# Patient Record
Sex: FEMALE | Race: WHITE
[De-identification: names, ages, dates, MRNs, and addresses within clinical notes are randomized per-mention and may not be internally consistent; named-entity substitution may affect disease eponyms.]

---

## 2018-09-21 ENCOUNTER — HOSPITAL ENCOUNTER (EMERGENCY)
Age: 23
Discharge: HOME | End: 2018-09-21
Payer: COMMERCIAL

## 2018-09-21 VITALS
HEART RATE: 93 BPM | DIASTOLIC BLOOD PRESSURE: 65 MMHG | OXYGEN SATURATION: 96 % | SYSTOLIC BLOOD PRESSURE: 134 MMHG | RESPIRATION RATE: 18 BRPM

## 2018-09-21 VITALS
SYSTOLIC BLOOD PRESSURE: 131 MMHG | HEART RATE: 84 BPM | OXYGEN SATURATION: 99 % | DIASTOLIC BLOOD PRESSURE: 63 MMHG | RESPIRATION RATE: 18 BRPM

## 2018-09-21 VITALS
RESPIRATION RATE: 16 BRPM | DIASTOLIC BLOOD PRESSURE: 63 MMHG | HEART RATE: 82 BPM | OXYGEN SATURATION: 99 % | SYSTOLIC BLOOD PRESSURE: 125 MMHG | BODY MASS INDEX: 32.5 KG/M2 | TEMPERATURE: 98.42 F

## 2018-09-21 DIAGNOSIS — R10.2: ICD-10-CM

## 2018-09-21 DIAGNOSIS — N83.201: Primary | ICD-10-CM

## 2018-09-21 LAB
ALANINE AMINOTRANSFER ALT/SGPT: 28 U/L (ref 13–56)
ALBUMIN SERPL-MCNC: 3.8 G/DL (ref 3.2–5)
ALKALINE PHOSPHATASE: 57 U/L (ref 45–117)
ANION GAP: 7 (ref 5–15)
AST(SGOT): 28 U/L (ref 15–37)
B-HCG SERPL QL: NEGATIVE NEGATIVE
BILIRUB DIRECT SERPL-MCNC: 0.08 MG/DL (ref 0–0.3)
BUN SERPL-MCNC: 10 MG/DL (ref 7–18)
BUN/CREAT RATIO: 11 RATIO (ref 10–20)
CALCIUM SERPL-MCNC: 8.5 MG/DL (ref 8.5–10.1)
CARBON DIOXIDE: 24 MMOL/L (ref 21–32)
CHLORIDE: 107 MMOL/L (ref 98–107)
DEPRECATED RDW RBC: 39.7 FL (ref 35.1–43.9)
DIFFERENTIAL INDICATED: (no result)
ERYTHROCYTE [DISTWIDTH] IN BLOOD: 12.8 % (ref 11.6–14.6)
EST GLOM FILT RATE - AFR AMER: 98 ML/MIN (ref 60–?)
ESTIMATED CREATININE CLEARANCE: 89.24 ML/MIN
GLOBULIN: 3.9 G/DL (ref 2.2–4.2)
GLUCOSE: 113 MG/DL (ref 74–106)
HCG SERPL QL: NEGATIVE NEGATIVE
HCT VFR BLD AUTO: 35.3 % (ref 37–47)
HCT VFR BLD AUTO: 37.2 % (ref 37–47)
HEMOGLOBIN: 11.7 G/DL (ref 12–15)
HEMOGLOBIN: 12.3 G/DL (ref 12–15)
HGB BLD-MCNC: 11.7 G/DL (ref 12–15)
HGB BLD-MCNC: 12.3 G/DL (ref 12–15)
IMMATURE GRANULOCYTES COUNT: 0.02 X10^3/UL (ref 0–0)
KETONE-DIPSTICK: 5 MG/DL
LEUKOCYTE ESTERASE UR QL STRIP: 25 /UL
LIPASE: 82 U/L (ref 73–393)
MCV RBC: 85.1 FL (ref 81–99)
MEAN CORP HGB CONC: 33.1 G/GL (ref 32–36)
MEAN PLATELET VOL.: 10.3 FL (ref 6.2–12)
MUCOUS THREADS URNS QL MICRO: (no result) /HPF
PLATELET # BLD: 268 K/MM3 (ref 150–450)
PLATELET COUNT: 268 K/MM3 (ref 150–450)
POSITIVE COUNT: NO
POSITIVE DIFFERENTIAL: NO
POSITIVE MORPHOLOGY: NO
POTASSIUM: 4.9 MMOL/L (ref 3.5–5.1)
PROT UR QL STRIP.AUTO: 30 MG/DL
RBC # BLD AUTO: 4.37 M/MM3 (ref 4.2–5.4)
RBC DISTRIBUTION WIDTH CV: 12.8 % (ref 11.6–14.6)
RBC DISTRIBUTION WIDTH SD: 39.7 FL (ref 35.1–43.9)
RBC UR QL: (no result) /HPF (ref 0–5)
SP GR UR: 1.01 (ref 1–1.03)
SQUAMOUS URNS QL MICRO: (no result) /HPF (ref 5–10)
URINE PRESERVATIVE: (no result)
WBC # BLD AUTO: 10.3 K/MM3 (ref 4.4–11)
WHITE BLOOD COUNT: 10.3 K/MM3 (ref 4.4–11)

## 2018-09-21 PROCEDURE — 87110 CHLAMYDIA CULTURE: CPT

## 2018-09-21 PROCEDURE — 80076 HEPATIC FUNCTION PANEL: CPT

## 2018-09-21 PROCEDURE — 83690 ASSAY OF LIPASE: CPT

## 2018-09-21 PROCEDURE — 99285 EMERGENCY DEPT VISIT HI MDM: CPT

## 2018-09-21 PROCEDURE — 74176 CT ABD & PELVIS W/O CONTRAST: CPT

## 2018-09-21 PROCEDURE — 76830 TRANSVAGINAL US NON-OB: CPT

## 2018-09-21 PROCEDURE — 85014 HEMATOCRIT: CPT

## 2018-09-21 PROCEDURE — 85025 COMPLETE CBC W/AUTO DIFF WBC: CPT

## 2018-09-21 PROCEDURE — 87140 CULTURE TYPE IMMUNOFLUORESC: CPT

## 2018-09-21 PROCEDURE — 87210 SMEAR WET MOUNT SALINE/INK: CPT

## 2018-09-21 PROCEDURE — 80048 BASIC METABOLIC PNL TOTAL CA: CPT

## 2018-09-21 PROCEDURE — 87081 CULTURE SCREEN ONLY: CPT

## 2018-09-21 PROCEDURE — 81001 URINALYSIS AUTO W/SCOPE: CPT

## 2018-09-21 PROCEDURE — 84703 CHORIONIC GONADOTROPIN ASSAY: CPT

## 2018-09-21 PROCEDURE — A4216 STERILE WATER/SALINE, 10 ML: HCPCS

## 2018-09-21 PROCEDURE — 85018 HEMOGLOBIN: CPT

## 2018-09-24 ENCOUNTER — HOSPITAL ENCOUNTER (OUTPATIENT)
Age: 23
End: 2018-09-24
Payer: COMMERCIAL

## 2018-09-24 DIAGNOSIS — N83.201: ICD-10-CM

## 2018-09-24 DIAGNOSIS — N92.6: Primary | ICD-10-CM

## 2018-09-24 DIAGNOSIS — N83.202: ICD-10-CM

## 2018-09-24 LAB
DEPRECATED RDW RBC: 37.4 FL (ref 35.1–43.9)
DIFFERENTIAL INDICATED: (no result)
ERYTHROCYTE [DISTWIDTH] IN BLOOD: 12.4 % (ref 11.6–14.6)
HCT VFR BLD AUTO: 33.6 % (ref 37–47)
HEMOGLOBIN: 11.3 G/DL (ref 12–15)
HGB BLD-MCNC: 11.3 G/DL (ref 12–15)
IMMATURE GRANULOCYTES COUNT: 0.03 X10^3/UL (ref 0–0)
MCV RBC: 85.3 FL (ref 81–99)
MEAN CORP HGB CONC: 33.6 G/GL (ref 32–36)
MEAN PLATELET VOL.: 10.5 FL (ref 6.2–12)
PLATELET # BLD: 242 K/MM3 (ref 150–450)
PLATELET COUNT: 242 K/MM3 (ref 150–450)
POSITIVE COUNT: NO
POSITIVE DIFFERENTIAL: NO
POSITIVE MORPHOLOGY: NO
RBC # BLD AUTO: 3.94 M/MM3 (ref 4.2–5.4)
RBC DISTRIBUTION WIDTH CV: 12.4 % (ref 11.6–14.6)
RBC DISTRIBUTION WIDTH SD: 37.4 FL (ref 35.1–43.9)
WBC # BLD AUTO: 6.2 K/MM3 (ref 4.4–11)
WHITE BLOOD COUNT: 6.2 K/MM3 (ref 4.4–11)

## 2018-09-24 PROCEDURE — 84402 ASSAY OF FREE TESTOSTERONE: CPT

## 2018-09-24 PROCEDURE — 85025 COMPLETE CBC W/AUTO DIFF WBC: CPT

## 2018-09-24 PROCEDURE — 36415 COLL VENOUS BLD VENIPUNCTURE: CPT

## 2018-09-24 PROCEDURE — 82627 DEHYDROEPIANDROSTERONE: CPT

## 2018-10-26 ENCOUNTER — HOSPITAL ENCOUNTER (OUTPATIENT)
Age: 23
End: 2018-10-26
Payer: COMMERCIAL

## 2018-10-26 DIAGNOSIS — N63.10: Primary | ICD-10-CM

## 2018-10-26 PROCEDURE — 76642 ULTRASOUND BREAST LIMITED: CPT

## 2018-10-26 PROCEDURE — 77062 BREAST TOMOSYNTHESIS BI: CPT

## 2018-10-26 PROCEDURE — G0279 TOMOSYNTHESIS, MAMMO: HCPCS

## 2018-10-26 PROCEDURE — 77066 DX MAMMO INCL CAD BI: CPT

## 2018-11-16 ENCOUNTER — HOSPITAL ENCOUNTER (OUTPATIENT)
Age: 23
End: 2018-11-16
Payer: COMMERCIAL

## 2018-11-16 VITALS — BODY MASS INDEX: 31.6 KG/M2

## 2018-11-16 DIAGNOSIS — N63.10: Primary | ICD-10-CM

## 2018-11-16 PROCEDURE — 88305 TISSUE EXAM BY PATHOLOGIST: CPT

## 2020-01-29 ENCOUNTER — HOSPITAL ENCOUNTER (OUTPATIENT)
Age: 25
End: 2020-01-29
Payer: COMMERCIAL

## 2020-01-29 VITALS — BODY MASS INDEX: 31.6 KG/M2

## 2020-01-29 DIAGNOSIS — Z34.90: Primary | ICD-10-CM

## 2020-01-29 LAB — XTRA TUBE EP LAB: (no result)

## 2020-01-29 PROCEDURE — 86900 BLOOD TYPING SEROLOGIC ABO: CPT

## 2020-01-29 PROCEDURE — 86901 BLOOD TYPING SEROLOGIC RH(D): CPT

## 2020-01-29 PROCEDURE — 86850 RBC ANTIBODY SCREEN: CPT

## 2020-01-29 PROCEDURE — 36415 COLL VENOUS BLD VENIPUNCTURE: CPT

## 2020-02-10 ENCOUNTER — HOSPITAL ENCOUNTER (OUTPATIENT)
Age: 25
End: 2020-02-10
Payer: COMMERCIAL

## 2020-02-10 VITALS — BODY MASS INDEX: 31.6 KG/M2

## 2020-02-10 DIAGNOSIS — Z34.90: ICD-10-CM

## 2020-02-10 DIAGNOSIS — Z12.4: Primary | ICD-10-CM

## 2020-02-10 DIAGNOSIS — N92.6: ICD-10-CM

## 2020-02-10 LAB
AMPHET UR-MCNC: NEGATIVE NG/ML
BARBITURATE URINE VISTA: NEGATIVE
BENZODIAZEPINE URINE VISTA: NEGATIVE
COCAINE URINE VISTA: NEGATIVE
DRUG CONFIRMATION TO FOLLOW?: (no result)
ECSTACY URINE VISTA: NEGATIVE
METHADONE URINE VISTA: NEGATIVE
PCP UR QL: NEGATIVE
PH UR: 6 [PH]
SAMPLE ADEQUACY CONTROL: (no result)
THC URINE VISTA: NEGATIVE

## 2020-02-10 PROCEDURE — 87088 URINE BACTERIA CULTURE: CPT

## 2020-02-10 PROCEDURE — 88175 CYTOPATH C/V AUTO FLUID REDO: CPT

## 2020-02-10 PROCEDURE — 87086 URINE CULTURE/COLONY COUNT: CPT

## 2020-02-10 PROCEDURE — 87591 N.GONORRHOEAE DNA AMP PROB: CPT

## 2020-02-10 PROCEDURE — 87491 CHLMYD TRACH DNA AMP PROBE: CPT

## 2020-02-10 PROCEDURE — 80307 DRUG TEST PRSMV CHEM ANLYZR: CPT

## 2020-02-10 PROCEDURE — G0145 SCR C/V CYTO,THINLAYER,RESCR: HCPCS

## 2020-02-24 ENCOUNTER — HOSPITAL ENCOUNTER (OUTPATIENT)
Age: 25
End: 2020-02-24
Payer: COMMERCIAL

## 2020-02-24 VITALS — BODY MASS INDEX: 31.6 KG/M2

## 2020-02-24 DIAGNOSIS — Z31.430: ICD-10-CM

## 2020-02-24 DIAGNOSIS — Z34.81: Primary | ICD-10-CM

## 2020-02-24 LAB
DEPRECATED RDW RBC: 37.4 FL (ref 35.1–43.9)
ERYTHROCYTE [DISTWIDTH] IN BLOOD: 12.1 % (ref 11.6–14.6)
HCT VFR BLD AUTO: 40.6 % (ref 37–47)
HEMOGLOBIN: 13.6 G/DL (ref 12–15)
HGB BLD-MCNC: 13.6 G/DL (ref 12–15)
IMMATURE GRANULOCYTES COUNT: 0.04 X10^3/UL (ref 0–0)
MCV RBC: 85.3 FL (ref 81–99)
MEAN CORP HGB CONC: 33.5 G/DL (ref 32–36)
MEAN PLATELET VOL.: 10.2 FL (ref 6.2–12)
NATERA: (no result)
NRBC FLAGGED BY ANALYZER: 0 % (ref 0–5)
PLATELET # BLD: 249 K/MM3 (ref 150–450)
PLATELET COUNT: 249 K/MM3 (ref 150–450)
RBC # BLD AUTO: 4.76 M/MM3 (ref 4.2–5.4)
RBC DISTRIBUTION WIDTH CV: 12.1 % (ref 11.6–14.6)
RBC DISTRIBUTION WIDTH SD: 37.4 FL (ref 35.1–43.9)
WBC # BLD AUTO: 8.2 K/MM3 (ref 4.4–11)
WHITE BLOOD COUNT: 8.2 K/MM3 (ref 4.4–11)

## 2020-02-24 PROCEDURE — 86850 RBC ANTIBODY SCREEN: CPT

## 2020-02-24 PROCEDURE — 86592 SYPHILIS TEST NON-TREP QUAL: CPT

## 2020-02-24 PROCEDURE — 86901 BLOOD TYPING SEROLOGIC RH(D): CPT

## 2020-02-24 PROCEDURE — 36415 COLL VENOUS BLD VENIPUNCTURE: CPT

## 2020-02-24 PROCEDURE — 86900 BLOOD TYPING SEROLOGIC ABO: CPT

## 2020-02-24 PROCEDURE — 86803 HEPATITIS C AB TEST: CPT

## 2020-02-24 PROCEDURE — 86762 RUBELLA ANTIBODY: CPT

## 2020-02-24 PROCEDURE — 86703 HIV-1/HIV-2 1 RESULT ANTBDY: CPT

## 2020-02-24 PROCEDURE — 87340 HEPATITIS B SURFACE AG IA: CPT

## 2020-02-24 PROCEDURE — 85025 COMPLETE CBC W/AUTO DIFF WBC: CPT

## 2020-02-27 LAB — RAPID PLASMIN REAGIN (RPR): NONREACTIVE

## 2020-03-02 ENCOUNTER — HOSPITAL ENCOUNTER (OUTPATIENT)
Age: 25
End: 2020-03-02
Payer: COMMERCIAL

## 2020-03-02 VITALS — BODY MASS INDEX: 31.6 KG/M2 | BODY MASS INDEX: 31.1 KG/M2

## 2020-03-02 VITALS
RESPIRATION RATE: 16 BRPM | HEART RATE: 86 BPM | TEMPERATURE: 96.8 F | SYSTOLIC BLOOD PRESSURE: 129 MMHG | OXYGEN SATURATION: 100 % | DIASTOLIC BLOOD PRESSURE: 75 MMHG

## 2020-03-02 DIAGNOSIS — E86.0: Primary | ICD-10-CM

## 2020-03-02 PROCEDURE — 96375 TX/PRO/DX INJ NEW DRUG ADDON: CPT

## 2020-03-02 PROCEDURE — A4216 STERILE WATER/SALINE, 10 ML: HCPCS

## 2020-03-02 PROCEDURE — 96361 HYDRATE IV INFUSION ADD-ON: CPT

## 2020-03-02 PROCEDURE — 96374 THER/PROPH/DIAG INJ IV PUSH: CPT

## 2020-06-19 ENCOUNTER — HOSPITAL ENCOUNTER (OUTPATIENT)
Age: 25
End: 2020-06-19
Payer: COMMERCIAL

## 2020-06-19 VITALS — BODY MASS INDEX: 31.1 KG/M2

## 2020-06-19 DIAGNOSIS — Z34.02: Primary | ICD-10-CM

## 2020-06-19 LAB
DEPRECATED RDW RBC: 41.8 FL (ref 35.1–43.9)
ERYTHROCYTE [DISTWIDTH] IN BLOOD: 12.9 % (ref 11.6–14.6)
GLUCOSE 1H P 50 G GLC PO SERPL-MCNC: 88 MG/DL (ref 70–140)
HCT VFR BLD AUTO: 34.8 % (ref 37–47)
HEMOGLOBIN: 11.7 G/DL (ref 12–15)
HGB BLD-MCNC: 11.7 G/DL (ref 12–15)
IMMATURE GRANULOCYTES COUNT: 0.11 X10^3/UL (ref 0–0)
MCV RBC: 88.8 FL (ref 81–99)
MEAN CORP HGB CONC: 33.6 G/DL (ref 32–36)
MEAN PLATELET VOL.: 11.2 FL (ref 6.2–12)
NRBC FLAGGED BY ANALYZER: 0 % (ref 0–5)
PLATELET # BLD: 218 K/MM3 (ref 150–450)
PLATELET COUNT: 218 K/MM3 (ref 150–450)
RBC # BLD AUTO: 3.92 M/MM3 (ref 4.2–5.4)
RBC DISTRIBUTION WIDTH CV: 12.9 % (ref 11.6–14.6)
RBC DISTRIBUTION WIDTH SD: 41.8 FL (ref 35.1–43.9)
WBC # BLD AUTO: 9.4 K/MM3 (ref 4.4–11)
WHITE BLOOD COUNT: 9.4 K/MM3 (ref 4.4–11)

## 2020-06-19 PROCEDURE — 36415 COLL VENOUS BLD VENIPUNCTURE: CPT

## 2020-06-19 PROCEDURE — 85025 COMPLETE CBC W/AUTO DIFF WBC: CPT

## 2020-06-19 PROCEDURE — 82950 GLUCOSE TEST: CPT

## 2020-07-14 ENCOUNTER — HOSPITAL ENCOUNTER (OUTPATIENT)
Age: 25
End: 2020-07-14
Payer: COMMERCIAL

## 2020-07-14 VITALS — BODY MASS INDEX: 31.1 KG/M2

## 2020-07-14 DIAGNOSIS — R10.9: ICD-10-CM

## 2020-07-14 DIAGNOSIS — O26.899: ICD-10-CM

## 2020-07-14 DIAGNOSIS — N39.0: Primary | ICD-10-CM

## 2020-07-14 LAB
ALANINE AMINOTRANSFER ALT/SGPT: 22 U/L (ref 13–56)
ALBUMIN SERPL-MCNC: 2.6 G/DL (ref 3.2–5)
ALKALINE PHOSPHATASE: 78 U/L (ref 45–117)
ANION GAP: 7 (ref 5–15)
AST(SGOT): 13 U/L (ref 15–37)
BUN SERPL-MCNC: 5 MG/DL (ref 7–18)
BUN/CREAT RATIO: 8.1 RATIO (ref 10–20)
CALCIUM SERPL-MCNC: 8.1 MG/DL (ref 8.5–10.1)
CARBON DIOXIDE: 22 MMOL/L (ref 21–32)
CHLORIDE: 108 MMOL/L (ref 98–107)
CREAT UR-MCNC: 127 MG/DL
DEPRECATED RDW RBC: 41.5 FL (ref 35.1–43.9)
ERYTHROCYTE [DISTWIDTH] IN BLOOD: 12.8 % (ref 11.6–14.6)
EST GLOM FILT RATE - AFR AMER: 150 ML/MIN (ref 60–?)
GLOBULIN: 4 G/DL (ref 2.2–4.2)
GLUCOSE: 107 MG/DL (ref 74–106)
HCT VFR BLD AUTO: 36.1 % (ref 37–47)
HEMOGLOBIN: 12 G/DL (ref 12–15)
HGB BLD-MCNC: 12 G/DL (ref 12–15)
IMMATURE GRANULOCYTES COUNT: 0.21 X10^3/UL (ref 0–0)
MCV RBC: 89.4 FL (ref 81–99)
MEAN CORP HGB CONC: 33.2 G/DL (ref 32–36)
MEAN PLATELET VOL.: 10.9 FL (ref 6.2–12)
NRBC FLAGGED BY ANALYZER: 0 % (ref 0–5)
PLATELET # BLD: 211 K/MM3 (ref 150–450)
PLATELET COUNT: 211 K/MM3 (ref 150–450)
POTASSIUM: 3.8 MMOL/L (ref 3.5–5.1)
PROT UR-MCNC: 29.4 MG/DL (ref ?–11.9)
PROT/CREAT UR: 232 MG/G CRE (ref 0–200)
RBC # BLD AUTO: 4.04 M/MM3 (ref 4.2–5.4)
RBC DISTRIBUTION WIDTH CV: 12.8 % (ref 11.6–14.6)
RBC DISTRIBUTION WIDTH SD: 41.5 FL (ref 35.1–43.9)
WBC # BLD AUTO: 9.9 K/MM3 (ref 4.4–11)
WHITE BLOOD COUNT: 9.9 K/MM3 (ref 4.4–11)

## 2020-07-14 PROCEDURE — 87086 URINE CULTURE/COLONY COUNT: CPT

## 2020-07-14 PROCEDURE — 36415 COLL VENOUS BLD VENIPUNCTURE: CPT

## 2020-07-14 PROCEDURE — 76816 OB US FOLLOW-UP PER FETUS: CPT

## 2020-07-14 PROCEDURE — 85025 COMPLETE CBC W/AUTO DIFF WBC: CPT

## 2020-07-14 PROCEDURE — 87088 URINE BACTERIA CULTURE: CPT

## 2020-07-14 PROCEDURE — 84156 ASSAY OF PROTEIN URINE: CPT

## 2020-07-14 PROCEDURE — 80053 COMPREHEN METABOLIC PANEL: CPT

## 2020-07-14 PROCEDURE — 82570 ASSAY OF URINE CREATININE: CPT

## 2020-08-17 ENCOUNTER — HOSPITAL ENCOUNTER (OUTPATIENT)
Age: 25
End: 2020-08-17
Payer: COMMERCIAL

## 2020-08-17 VITALS — BODY MASS INDEX: 33.9 KG/M2

## 2020-08-17 DIAGNOSIS — Z34.90: Primary | ICD-10-CM

## 2020-08-17 PROCEDURE — 87081 CULTURE SCREEN ONLY: CPT

## 2020-09-08 ENCOUNTER — HOSPITAL ENCOUNTER (OUTPATIENT)
Age: 25
End: 2020-09-08
Payer: COMMERCIAL

## 2020-09-08 VITALS — BODY MASS INDEX: 31.1 KG/M2

## 2020-09-08 DIAGNOSIS — O26.843: Primary | ICD-10-CM

## 2020-09-08 DIAGNOSIS — Z3A.40: ICD-10-CM

## 2020-09-08 PROCEDURE — 76816 OB US FOLLOW-UP PER FETUS: CPT

## 2020-09-09 ENCOUNTER — HOSPITAL ENCOUNTER (OUTPATIENT)
Age: 25
End: 2020-09-09
Payer: COMMERCIAL

## 2020-09-09 VITALS — BODY MASS INDEX: 31.1 KG/M2

## 2020-09-09 DIAGNOSIS — Z11.59: Primary | ICD-10-CM

## 2020-09-09 PROCEDURE — 87635 SARS-COV-2 COVID-19 AMP PRB: CPT

## 2020-09-09 PROCEDURE — U0003 INFECTIOUS AGENT DETECTION BY NUCLEIC ACID (DNA OR RNA); SEVERE ACUTE RESPIRATORY SYNDROME CORONAVIRUS 2 (SARS-COV-2) (CORONAVIRUS DISEASE [COVID-19]), AMPLIFIED PROBE TECHNIQUE, MAKING USE OF HIGH THROUGHPUT TECHNOLOGIES AS DESCRIBED BY CMS-2020-01-R: HCPCS

## 2020-09-09 PROCEDURE — C9803 HOPD COVID-19 SPEC COLLECT: HCPCS

## 2020-09-18 ENCOUNTER — HOSPITAL ENCOUNTER (INPATIENT)
Age: 25
LOS: 4 days | Discharge: HOME | End: 2020-09-22
Payer: COMMERCIAL

## 2020-09-18 VITALS — OXYGEN SATURATION: 98 % | HEART RATE: 88 BPM | TEMPERATURE: 96.98 F

## 2020-09-18 VITALS
SYSTOLIC BLOOD PRESSURE: 114 MMHG | DIASTOLIC BLOOD PRESSURE: 71 MMHG | OXYGEN SATURATION: 98 % | HEART RATE: 91 BPM | TEMPERATURE: 97.3 F

## 2020-09-18 VITALS — TEMPERATURE: 96.6 F

## 2020-09-18 VITALS
HEART RATE: 95 BPM | TEMPERATURE: 97.34 F | DIASTOLIC BLOOD PRESSURE: 72 MMHG | OXYGEN SATURATION: 97 % | SYSTOLIC BLOOD PRESSURE: 118 MMHG

## 2020-09-18 VITALS — HEART RATE: 88 BPM | OXYGEN SATURATION: 98 %

## 2020-09-18 VITALS — TEMPERATURE: 97.34 F

## 2020-09-18 VITALS — DIASTOLIC BLOOD PRESSURE: 70 MMHG | HEART RATE: 97 BPM | SYSTOLIC BLOOD PRESSURE: 116 MMHG

## 2020-09-18 VITALS — DIASTOLIC BLOOD PRESSURE: 71 MMHG | SYSTOLIC BLOOD PRESSURE: 112 MMHG | HEART RATE: 91 BPM

## 2020-09-18 VITALS — BODY MASS INDEX: 34.8 KG/M2 | BODY MASS INDEX: 31.1 KG/M2

## 2020-09-18 DIAGNOSIS — E66.9: ICD-10-CM

## 2020-09-18 DIAGNOSIS — Z82.0: ICD-10-CM

## 2020-09-18 DIAGNOSIS — O48.0: ICD-10-CM

## 2020-09-18 DIAGNOSIS — Z3A.41: ICD-10-CM

## 2020-09-18 DIAGNOSIS — Z84.89: ICD-10-CM

## 2020-09-18 DIAGNOSIS — D62: ICD-10-CM

## 2020-09-18 LAB
DEPRECATED RDW RBC: 40.3 FL (ref 35.1–43.9)
ERYTHROCYTE [DISTWIDTH] IN BLOOD: 13.1 % (ref 11.6–14.6)
HCT VFR BLD AUTO: 38.1 % (ref 37–47)
HEMOGLOBIN: 12.8 G/DL (ref 12–15)
HGB BLD-MCNC: 12.8 G/DL (ref 12–15)
IMMATURE GRANULOCYTES COUNT: 0.27 X10^3/UL (ref 0–0)
MCV RBC: 86.4 FL (ref 81–99)
MEAN CORP HGB CONC: 33.6 G/DL (ref 32–36)
MEAN PLATELET VOL.: 11.4 FL (ref 6.2–12)
NRBC FLAGGED BY ANALYZER: 0 % (ref 0–5)
PLATELET # BLD: 222 K/MM3 (ref 150–450)
PLATELET COUNT: 222 K/MM3 (ref 150–450)
RBC # BLD AUTO: 4.41 M/MM3 (ref 4.2–5.4)
RBC DISTRIBUTION WIDTH CV: 13.1 % (ref 11.6–14.6)
RBC DISTRIBUTION WIDTH SD: 40.3 FL (ref 35.1–43.9)
WBC # BLD AUTO: 12.2 K/MM3 (ref 4.4–11)
WHITE BLOOD COUNT: 12.2 K/MM3 (ref 4.4–11)

## 2020-09-18 PROCEDURE — A4216 STERILE WATER/SALINE, 10 ML: HCPCS

## 2020-09-18 PROCEDURE — 59050 FETAL MONITOR W/REPORT: CPT

## 2020-09-18 PROCEDURE — 86900 BLOOD TYPING SEROLOGIC ABO: CPT

## 2020-09-18 PROCEDURE — 86850 RBC ANTIBODY SCREEN: CPT

## 2020-09-18 PROCEDURE — 85027 COMPLETE CBC AUTOMATED: CPT

## 2020-09-18 PROCEDURE — G0378 HOSPITAL OBSERVATION PER HR: HCPCS

## 2020-09-18 PROCEDURE — 59025 FETAL NON-STRESS TEST: CPT

## 2020-09-18 PROCEDURE — 85025 COMPLETE CBC W/AUTO DIFF WBC: CPT

## 2020-09-18 PROCEDURE — 86901 BLOOD TYPING SEROLOGIC RH(D): CPT

## 2020-09-18 PROCEDURE — 99218: CPT

## 2020-09-18 PROCEDURE — 88307 TISSUE EXAM BY PATHOLOGIST: CPT

## 2020-09-19 VITALS — OXYGEN SATURATION: 97 % | HEART RATE: 90 BPM

## 2020-09-19 VITALS — HEART RATE: 99 BPM | DIASTOLIC BLOOD PRESSURE: 44 MMHG | SYSTOLIC BLOOD PRESSURE: 90 MMHG | TEMPERATURE: 97.52 F

## 2020-09-19 VITALS
SYSTOLIC BLOOD PRESSURE: 124 MMHG | HEART RATE: 107 BPM | OXYGEN SATURATION: 98 % | DIASTOLIC BLOOD PRESSURE: 75 MMHG | TEMPERATURE: 97.2 F

## 2020-09-19 VITALS — OXYGEN SATURATION: 98 % | SYSTOLIC BLOOD PRESSURE: 98 MMHG | HEART RATE: 88 BPM | DIASTOLIC BLOOD PRESSURE: 57 MMHG

## 2020-09-19 VITALS — DIASTOLIC BLOOD PRESSURE: 63 MMHG | SYSTOLIC BLOOD PRESSURE: 109 MMHG | HEART RATE: 90 BPM | OXYGEN SATURATION: 89 %

## 2020-09-19 VITALS — DIASTOLIC BLOOD PRESSURE: 81 MMHG | SYSTOLIC BLOOD PRESSURE: 128 MMHG | HEART RATE: 93 BPM | OXYGEN SATURATION: 97 %

## 2020-09-19 VITALS — HEART RATE: 102 BPM | OXYGEN SATURATION: 99 %

## 2020-09-19 VITALS — HEART RATE: 84 BPM | OXYGEN SATURATION: 97 %

## 2020-09-19 VITALS — SYSTOLIC BLOOD PRESSURE: 108 MMHG | HEART RATE: 93 BPM | OXYGEN SATURATION: 98 % | DIASTOLIC BLOOD PRESSURE: 67 MMHG

## 2020-09-19 VITALS — SYSTOLIC BLOOD PRESSURE: 98 MMHG | DIASTOLIC BLOOD PRESSURE: 59 MMHG | TEMPERATURE: 99.68 F | HEART RATE: 98 BPM

## 2020-09-19 VITALS
SYSTOLIC BLOOD PRESSURE: 105 MMHG | OXYGEN SATURATION: 97 % | HEART RATE: 92 BPM | DIASTOLIC BLOOD PRESSURE: 57 MMHG | TEMPERATURE: 99.7 F

## 2020-09-19 VITALS
DIASTOLIC BLOOD PRESSURE: 54 MMHG | SYSTOLIC BLOOD PRESSURE: 99 MMHG | HEART RATE: 88 BPM | OXYGEN SATURATION: 98 % | TEMPERATURE: 98.8 F

## 2020-09-19 VITALS — SYSTOLIC BLOOD PRESSURE: 97 MMHG | HEART RATE: 83 BPM | DIASTOLIC BLOOD PRESSURE: 56 MMHG

## 2020-09-19 VITALS — HEART RATE: 109 BPM | OXYGEN SATURATION: 98 %

## 2020-09-19 VITALS — OXYGEN SATURATION: 97 % | HEART RATE: 86 BPM

## 2020-09-19 VITALS — DIASTOLIC BLOOD PRESSURE: 64 MMHG | HEART RATE: 98 BPM | OXYGEN SATURATION: 97 % | SYSTOLIC BLOOD PRESSURE: 118 MMHG

## 2020-09-19 VITALS — SYSTOLIC BLOOD PRESSURE: 91 MMHG | HEART RATE: 87 BPM | DIASTOLIC BLOOD PRESSURE: 50 MMHG | OXYGEN SATURATION: 98 %

## 2020-09-19 VITALS — HEART RATE: 103 BPM | OXYGEN SATURATION: 98 % | SYSTOLIC BLOOD PRESSURE: 94 MMHG | DIASTOLIC BLOOD PRESSURE: 55 MMHG

## 2020-09-19 VITALS — OXYGEN SATURATION: 97 % | HEART RATE: 91 BPM | SYSTOLIC BLOOD PRESSURE: 126 MMHG | DIASTOLIC BLOOD PRESSURE: 81 MMHG

## 2020-09-19 VITALS — DIASTOLIC BLOOD PRESSURE: 55 MMHG | SYSTOLIC BLOOD PRESSURE: 89 MMHG | HEART RATE: 98 BPM

## 2020-09-19 VITALS — SYSTOLIC BLOOD PRESSURE: 98 MMHG | DIASTOLIC BLOOD PRESSURE: 53 MMHG | HEART RATE: 94 BPM

## 2020-09-19 VITALS — TEMPERATURE: 100 F | SYSTOLIC BLOOD PRESSURE: 106 MMHG | DIASTOLIC BLOOD PRESSURE: 60 MMHG | HEART RATE: 101 BPM

## 2020-09-19 VITALS — TEMPERATURE: 99.32 F

## 2020-09-19 VITALS
HEART RATE: 102 BPM | OXYGEN SATURATION: 98 % | DIASTOLIC BLOOD PRESSURE: 66 MMHG | TEMPERATURE: 99.32 F | SYSTOLIC BLOOD PRESSURE: 112 MMHG

## 2020-09-19 VITALS — OXYGEN SATURATION: 98 % | HEART RATE: 93 BPM

## 2020-09-19 VITALS — OXYGEN SATURATION: 97 % | DIASTOLIC BLOOD PRESSURE: 64 MMHG | SYSTOLIC BLOOD PRESSURE: 119 MMHG | HEART RATE: 93 BPM

## 2020-09-19 VITALS — HEART RATE: 84 BPM | TEMPERATURE: 97.34 F | OXYGEN SATURATION: 99 %

## 2020-09-19 VITALS — OXYGEN SATURATION: 98 % | DIASTOLIC BLOOD PRESSURE: 52 MMHG | SYSTOLIC BLOOD PRESSURE: 92 MMHG | HEART RATE: 90 BPM

## 2020-09-19 VITALS — DIASTOLIC BLOOD PRESSURE: 69 MMHG | SYSTOLIC BLOOD PRESSURE: 116 MMHG | TEMPERATURE: 98.2 F | HEART RATE: 86 BPM

## 2020-09-19 VITALS — DIASTOLIC BLOOD PRESSURE: 56 MMHG | SYSTOLIC BLOOD PRESSURE: 115 MMHG | HEART RATE: 82 BPM

## 2020-09-19 VITALS — OXYGEN SATURATION: 98 % | HEART RATE: 106 BPM | SYSTOLIC BLOOD PRESSURE: 110 MMHG | DIASTOLIC BLOOD PRESSURE: 67 MMHG

## 2020-09-19 VITALS — SYSTOLIC BLOOD PRESSURE: 94 MMHG | DIASTOLIC BLOOD PRESSURE: 50 MMHG | HEART RATE: 83 BPM | OXYGEN SATURATION: 98 %

## 2020-09-19 VITALS — OXYGEN SATURATION: 97 % | DIASTOLIC BLOOD PRESSURE: 57 MMHG | HEART RATE: 92 BPM | SYSTOLIC BLOOD PRESSURE: 106 MMHG

## 2020-09-19 VITALS — SYSTOLIC BLOOD PRESSURE: 96 MMHG | DIASTOLIC BLOOD PRESSURE: 53 MMHG | HEART RATE: 83 BPM

## 2020-09-19 VITALS — SYSTOLIC BLOOD PRESSURE: 113 MMHG | DIASTOLIC BLOOD PRESSURE: 75 MMHG | HEART RATE: 92 BPM | OXYGEN SATURATION: 99 %

## 2020-09-19 VITALS — TEMPERATURE: 98.96 F | HEART RATE: 100 BPM | OXYGEN SATURATION: 99 %

## 2020-09-19 VITALS — OXYGEN SATURATION: 98 %

## 2020-09-19 VITALS — HEART RATE: 90 BPM | SYSTOLIC BLOOD PRESSURE: 113 MMHG | DIASTOLIC BLOOD PRESSURE: 61 MMHG

## 2020-09-19 VITALS — OXYGEN SATURATION: 99 % | HEART RATE: 98 BPM

## 2020-09-19 VITALS — OXYGEN SATURATION: 97 % | HEART RATE: 100 BPM

## 2020-09-19 VITALS — TEMPERATURE: 99.6 F | HEART RATE: 103 BPM | SYSTOLIC BLOOD PRESSURE: 99 MMHG | DIASTOLIC BLOOD PRESSURE: 57 MMHG

## 2020-09-19 VITALS — SYSTOLIC BLOOD PRESSURE: 108 MMHG | DIASTOLIC BLOOD PRESSURE: 64 MMHG | HEART RATE: 105 BPM

## 2020-09-19 VITALS — HEART RATE: 90 BPM | DIASTOLIC BLOOD PRESSURE: 77 MMHG | SYSTOLIC BLOOD PRESSURE: 130 MMHG

## 2020-09-19 VITALS — OXYGEN SATURATION: 98 % | HEART RATE: 101 BPM

## 2020-09-19 VITALS — HEART RATE: 100 BPM | OXYGEN SATURATION: 97 %

## 2020-09-19 VITALS — HEART RATE: 103 BPM | DIASTOLIC BLOOD PRESSURE: 77 MMHG | OXYGEN SATURATION: 97 % | SYSTOLIC BLOOD PRESSURE: 128 MMHG

## 2020-09-19 VITALS — HEART RATE: 102 BPM | SYSTOLIC BLOOD PRESSURE: 128 MMHG | OXYGEN SATURATION: 98 % | DIASTOLIC BLOOD PRESSURE: 73 MMHG

## 2020-09-19 VITALS — HEART RATE: 85 BPM | OXYGEN SATURATION: 96 %

## 2020-09-19 VITALS — HEART RATE: 92 BPM | OXYGEN SATURATION: 97 %

## 2020-09-19 VITALS — TEMPERATURE: 100.3 F

## 2020-09-19 VITALS — TEMPERATURE: 97.34 F

## 2020-09-19 VITALS — SYSTOLIC BLOOD PRESSURE: 95 MMHG | HEART RATE: 86 BPM | DIASTOLIC BLOOD PRESSURE: 54 MMHG

## 2020-09-19 VITALS — SYSTOLIC BLOOD PRESSURE: 116 MMHG | DIASTOLIC BLOOD PRESSURE: 66 MMHG | OXYGEN SATURATION: 98 % | HEART RATE: 101 BPM

## 2020-09-19 VITALS — HEART RATE: 90 BPM | OXYGEN SATURATION: 96 %

## 2020-09-19 VITALS — TEMPERATURE: 97.7 F

## 2020-09-19 VITALS — HEART RATE: 100 BPM | DIASTOLIC BLOOD PRESSURE: 69 MMHG | SYSTOLIC BLOOD PRESSURE: 118 MMHG

## 2020-09-19 VITALS — OXYGEN SATURATION: 97 %

## 2020-09-19 VITALS — HEART RATE: 91 BPM | OXYGEN SATURATION: 99 %

## 2020-09-19 VITALS — TEMPERATURE: 98.78 F

## 2020-09-20 VITALS
DIASTOLIC BLOOD PRESSURE: 62 MMHG | SYSTOLIC BLOOD PRESSURE: 110 MMHG | HEART RATE: 97 BPM | RESPIRATION RATE: 16 BRPM | OXYGEN SATURATION: 97 %

## 2020-09-20 VITALS
TEMPERATURE: 97 F | OXYGEN SATURATION: 97 % | RESPIRATION RATE: 15 BRPM | HEART RATE: 99 BPM | DIASTOLIC BLOOD PRESSURE: 58 MMHG | SYSTOLIC BLOOD PRESSURE: 91 MMHG

## 2020-09-20 VITALS
DIASTOLIC BLOOD PRESSURE: 56 MMHG | TEMPERATURE: 100.3 F | OXYGEN SATURATION: 98 % | SYSTOLIC BLOOD PRESSURE: 110 MMHG | HEART RATE: 125 BPM

## 2020-09-20 VITALS
DIASTOLIC BLOOD PRESSURE: 55 MMHG | TEMPERATURE: 100.4 F | SYSTOLIC BLOOD PRESSURE: 109 MMHG | HEART RATE: 103 BPM | OXYGEN SATURATION: 100 %

## 2020-09-20 VITALS — OXYGEN SATURATION: 98 % | HEART RATE: 100 BPM | SYSTOLIC BLOOD PRESSURE: 113 MMHG | DIASTOLIC BLOOD PRESSURE: 64 MMHG

## 2020-09-20 VITALS — OXYGEN SATURATION: 98 %

## 2020-09-20 VITALS — OXYGEN SATURATION: 98 % | HEART RATE: 127 BPM | SYSTOLIC BLOOD PRESSURE: 97 MMHG | DIASTOLIC BLOOD PRESSURE: 54 MMHG

## 2020-09-20 VITALS
OXYGEN SATURATION: 96 % | DIASTOLIC BLOOD PRESSURE: 66 MMHG | TEMPERATURE: 98.1 F | SYSTOLIC BLOOD PRESSURE: 110 MMHG | RESPIRATION RATE: 18 BRPM | HEART RATE: 100 BPM

## 2020-09-20 VITALS — TEMPERATURE: 99.6 F

## 2020-09-20 VITALS
RESPIRATION RATE: 16 BRPM | DIASTOLIC BLOOD PRESSURE: 72 MMHG | OXYGEN SATURATION: 96 % | SYSTOLIC BLOOD PRESSURE: 110 MMHG | HEART RATE: 99 BPM | TEMPERATURE: 97.5 F

## 2020-09-20 VITALS — HEART RATE: 101 BPM | SYSTOLIC BLOOD PRESSURE: 113 MMHG | TEMPERATURE: 100.04 F | DIASTOLIC BLOOD PRESSURE: 60 MMHG

## 2020-09-20 VITALS
HEART RATE: 111 BPM | SYSTOLIC BLOOD PRESSURE: 110 MMHG | OXYGEN SATURATION: 96 % | RESPIRATION RATE: 16 BRPM | DIASTOLIC BLOOD PRESSURE: 53 MMHG

## 2020-09-20 VITALS
TEMPERATURE: 98.06 F | RESPIRATION RATE: 18 BRPM | HEART RATE: 122 BPM | OXYGEN SATURATION: 97 % | SYSTOLIC BLOOD PRESSURE: 107 MMHG | DIASTOLIC BLOOD PRESSURE: 56 MMHG

## 2020-09-20 VITALS
HEART RATE: 110 BPM | OXYGEN SATURATION: 97 % | TEMPERATURE: 98.06 F | DIASTOLIC BLOOD PRESSURE: 77 MMHG | RESPIRATION RATE: 18 BRPM | SYSTOLIC BLOOD PRESSURE: 120 MMHG

## 2020-09-20 VITALS
SYSTOLIC BLOOD PRESSURE: 110 MMHG | TEMPERATURE: 97.34 F | HEART RATE: 106 BPM | RESPIRATION RATE: 14 BRPM | OXYGEN SATURATION: 97 % | DIASTOLIC BLOOD PRESSURE: 56 MMHG

## 2020-09-20 VITALS
DIASTOLIC BLOOD PRESSURE: 56 MMHG | SYSTOLIC BLOOD PRESSURE: 110 MMHG | TEMPERATURE: 97.3 F | RESPIRATION RATE: 16 BRPM | OXYGEN SATURATION: 96 % | HEART RATE: 108 BPM

## 2020-09-20 VITALS — TEMPERATURE: 100.04 F

## 2020-09-20 VITALS
HEART RATE: 102 BPM | TEMPERATURE: 99.5 F | SYSTOLIC BLOOD PRESSURE: 83 MMHG | DIASTOLIC BLOOD PRESSURE: 46 MMHG | OXYGEN SATURATION: 98 %

## 2020-09-20 VITALS — HEART RATE: 99 BPM | DIASTOLIC BLOOD PRESSURE: 60 MMHG | SYSTOLIC BLOOD PRESSURE: 144 MMHG

## 2020-09-20 VITALS
SYSTOLIC BLOOD PRESSURE: 114 MMHG | OXYGEN SATURATION: 98 % | DIASTOLIC BLOOD PRESSURE: 55 MMHG | TEMPERATURE: 102.92 F | HEART RATE: 122 BPM

## 2020-09-20 VITALS
RESPIRATION RATE: 14 BRPM | DIASTOLIC BLOOD PRESSURE: 56 MMHG | HEART RATE: 98 BPM | SYSTOLIC BLOOD PRESSURE: 109 MMHG | TEMPERATURE: 97.52 F | OXYGEN SATURATION: 97 %

## 2020-09-20 VITALS
RESPIRATION RATE: 14 BRPM | DIASTOLIC BLOOD PRESSURE: 70 MMHG | SYSTOLIC BLOOD PRESSURE: 113 MMHG | HEART RATE: 103 BPM | OXYGEN SATURATION: 97 % | TEMPERATURE: 97.34 F

## 2020-09-20 VITALS — SYSTOLIC BLOOD PRESSURE: 99 MMHG | HEART RATE: 114 BPM | DIASTOLIC BLOOD PRESSURE: 56 MMHG

## 2020-09-20 VITALS
DIASTOLIC BLOOD PRESSURE: 56 MMHG | HEART RATE: 116 BPM | OXYGEN SATURATION: 95 % | TEMPERATURE: 98.06 F | SYSTOLIC BLOOD PRESSURE: 103 MMHG | RESPIRATION RATE: 16 BRPM

## 2020-09-20 VITALS — HEART RATE: 97 BPM | DIASTOLIC BLOOD PRESSURE: 55 MMHG | SYSTOLIC BLOOD PRESSURE: 92 MMHG

## 2020-09-20 VITALS — SYSTOLIC BLOOD PRESSURE: 102 MMHG | DIASTOLIC BLOOD PRESSURE: 58 MMHG | TEMPERATURE: 100.22 F | HEART RATE: 97 BPM

## 2020-09-20 VITALS — TEMPERATURE: 100.22 F | HEART RATE: 102 BPM | OXYGEN SATURATION: 99 %

## 2020-09-20 VITALS — HEART RATE: 96 BPM | SYSTOLIC BLOOD PRESSURE: 84 MMHG | DIASTOLIC BLOOD PRESSURE: 48 MMHG

## 2020-09-20 VITALS — TEMPERATURE: 100.76 F

## 2020-09-20 VITALS — OXYGEN SATURATION: 100 %

## 2020-09-20 VITALS — DIASTOLIC BLOOD PRESSURE: 57 MMHG | OXYGEN SATURATION: 99 % | HEART RATE: 101 BPM | SYSTOLIC BLOOD PRESSURE: 111 MMHG

## 2020-09-20 VITALS — HEART RATE: 111 BPM | OXYGEN SATURATION: 98 % | DIASTOLIC BLOOD PRESSURE: 67 MMHG | SYSTOLIC BLOOD PRESSURE: 118 MMHG

## 2020-09-20 VITALS — DIASTOLIC BLOOD PRESSURE: 57 MMHG | SYSTOLIC BLOOD PRESSURE: 102 MMHG | HEART RATE: 115 BPM

## 2020-09-20 VITALS
SYSTOLIC BLOOD PRESSURE: 112 MMHG | HEART RATE: 102 BPM | RESPIRATION RATE: 16 BRPM | DIASTOLIC BLOOD PRESSURE: 56 MMHG | OXYGEN SATURATION: 97 %

## 2020-09-20 VITALS — TEMPERATURE: 100 F

## 2020-09-21 VITALS
OXYGEN SATURATION: 98 % | SYSTOLIC BLOOD PRESSURE: 119 MMHG | RESPIRATION RATE: 18 BRPM | HEART RATE: 100 BPM | TEMPERATURE: 96.98 F | DIASTOLIC BLOOD PRESSURE: 62 MMHG

## 2020-09-21 VITALS
DIASTOLIC BLOOD PRESSURE: 80 MMHG | HEART RATE: 110 BPM | OXYGEN SATURATION: 98 % | RESPIRATION RATE: 18 BRPM | TEMPERATURE: 98.06 F | SYSTOLIC BLOOD PRESSURE: 125 MMHG

## 2020-09-21 VITALS
TEMPERATURE: 96.6 F | HEART RATE: 118 BPM | DIASTOLIC BLOOD PRESSURE: 69 MMHG | SYSTOLIC BLOOD PRESSURE: 117 MMHG | RESPIRATION RATE: 17 BRPM | OXYGEN SATURATION: 96 %

## 2020-09-21 VITALS
TEMPERATURE: 96.1 F | SYSTOLIC BLOOD PRESSURE: 109 MMHG | HEART RATE: 106 BPM | OXYGEN SATURATION: 97 % | RESPIRATION RATE: 16 BRPM | DIASTOLIC BLOOD PRESSURE: 70 MMHG

## 2020-09-21 VITALS
TEMPERATURE: 98 F | RESPIRATION RATE: 18 BRPM | OXYGEN SATURATION: 96 % | HEART RATE: 104 BPM | DIASTOLIC BLOOD PRESSURE: 56 MMHG | SYSTOLIC BLOOD PRESSURE: 115 MMHG

## 2020-09-21 VITALS
SYSTOLIC BLOOD PRESSURE: 124 MMHG | TEMPERATURE: 96.9 F | HEART RATE: 109 BPM | RESPIRATION RATE: 16 BRPM | DIASTOLIC BLOOD PRESSURE: 66 MMHG | OXYGEN SATURATION: 98 %

## 2020-09-21 LAB
DEPRECATED RDW RBC: 42 FL (ref 35.1–43.9)
ERYTHROCYTE [DISTWIDTH] IN BLOOD: 13.4 % (ref 11.6–14.6)
HCT VFR BLD AUTO: 28.6 % (ref 37–47)
HEMOGLOBIN: 9.5 G/DL (ref 12–15)
HGB BLD-MCNC: 9.5 G/DL (ref 12–15)
MCV RBC: 86.9 FL (ref 81–99)
MEAN CORP HGB CONC: 33.2 G/DL (ref 32–36)
MEAN PLATELET VOL.: 11 FL (ref 6.2–12)
PLATELET # BLD: 172 K/MM3 (ref 150–450)
PLATELET COUNT: 172 K/MM3 (ref 150–450)
RBC # BLD AUTO: 3.29 M/MM3 (ref 4.2–5.4)
RBC DISTRIBUTION WIDTH CV: 13.4 % (ref 11.6–14.6)
RBC DISTRIBUTION WIDTH SD: 42 FL (ref 35.1–43.9)
WBC # BLD AUTO: 10.6 K/MM3 (ref 4.4–11)
WHITE BLOOD COUNT: 10.6 K/MM3 (ref 4.4–11)

## 2020-09-22 ENCOUNTER — HOSPITAL ENCOUNTER (INPATIENT)
Dept: HOSPITAL 100 - ED | Age: 25
LOS: 1 days | Discharge: HOME | DRG: 776 | End: 2020-09-23
Payer: COMMERCIAL

## 2020-09-22 VITALS
HEART RATE: 129 BPM | SYSTOLIC BLOOD PRESSURE: 148 MMHG | OXYGEN SATURATION: 99 % | RESPIRATION RATE: 18 BRPM | DIASTOLIC BLOOD PRESSURE: 79 MMHG | TEMPERATURE: 102.7 F

## 2020-09-22 VITALS
TEMPERATURE: 97.7 F | RESPIRATION RATE: 16 BRPM | SYSTOLIC BLOOD PRESSURE: 120 MMHG | DIASTOLIC BLOOD PRESSURE: 75 MMHG | HEART RATE: 116 BPM

## 2020-09-22 VITALS
SYSTOLIC BLOOD PRESSURE: 120 MMHG | OXYGEN SATURATION: 98 % | DIASTOLIC BLOOD PRESSURE: 59 MMHG | RESPIRATION RATE: 18 BRPM | HEART RATE: 97 BPM | TEMPERATURE: 98.06 F

## 2020-09-22 VITALS
SYSTOLIC BLOOD PRESSURE: 123 MMHG | DIASTOLIC BLOOD PRESSURE: 72 MMHG | OXYGEN SATURATION: 98 % | HEART RATE: 100 BPM | TEMPERATURE: 98.42 F | RESPIRATION RATE: 16 BRPM

## 2020-09-22 VITALS
TEMPERATURE: 98.2 F | RESPIRATION RATE: 16 BRPM | OXYGEN SATURATION: 98 % | SYSTOLIC BLOOD PRESSURE: 118 MMHG | HEART RATE: 97 BPM | DIASTOLIC BLOOD PRESSURE: 66 MMHG

## 2020-09-22 VITALS — DIASTOLIC BLOOD PRESSURE: 68 MMHG | SYSTOLIC BLOOD PRESSURE: 110 MMHG

## 2020-09-22 VITALS — RESPIRATION RATE: 16 BRPM

## 2020-09-22 VITALS — BODY MASS INDEX: 33.2 KG/M2 | BODY MASS INDEX: 34.7 KG/M2 | BODY MASS INDEX: 34.8 KG/M2

## 2020-09-22 DIAGNOSIS — Z82.0: ICD-10-CM

## 2020-09-22 DIAGNOSIS — Z90.49: ICD-10-CM

## 2020-09-22 LAB
ALANINE AMINOTRANSFER ALT/SGPT: 29 U/L (ref 13–56)
ALBUMIN SERPL-MCNC: 1.8 G/DL (ref 3.2–5)
ALKALINE PHOSPHATASE: 210 U/L (ref 45–117)
ANION GAP: 8 (ref 5–15)
ANISOCYTOSIS BLD QL SMEAR: (no result)
ANISOCYTOSIS: (no result)
AST(SGOT): 45 U/L (ref 15–37)
BUN SERPL-MCNC: 12 MG/DL (ref 7–18)
BUN/CREAT RATIO: 13.1 RATIO (ref 10–20)
CALCIUM SERPL-MCNC: 8.1 MG/DL (ref 8.5–10.1)
CARBON DIOXIDE: 24 MMOL/L (ref 21–32)
CHLORIDE: 106 MMOL/L (ref 98–107)
DEPRECATED RDW RBC: 42.4 FL (ref 35.1–43.9)
DIFFERENTIAL COMMENT: (no result)
DIFFERENTIAL INDICATED: (no result)
ERYTHROCYTE [DISTWIDTH] IN BLOOD: 13.2 % (ref 11.6–14.6)
EST GLOM FILT RATE - AFR AMER: 95 ML/MIN (ref 60–?)
ESTIMATED CREATININE CLEARANCE: 86.75 ML/MIN
GLOBULIN: 4 G/DL (ref 2.2–4.2)
GLUCOSE: 65 MG/DL (ref 74–106)
HCT VFR BLD AUTO: 24.4 % (ref 37–47)
HEMOGLOBIN: 8.1 G/DL (ref 12–15)
HGB BLD-MCNC: 8.1 G/DL (ref 12–15)
IMMATURE GRANULOCYTES COUNT: 0.2 X10^3/UL (ref 0–0)
KETONE-DIPSTICK: 5 MG/DL
LEUKOCYTE ESTERASE UR QL STRIP: 500 /UL
MANUAL DIF COMMENT BLD-IMP: (no result)
MCV RBC: 87.8 FL (ref 81–99)
MEAN CORP HGB CONC: 33.2 G/DL (ref 32–36)
MEAN PLATELET VOL.: 11.6 FL (ref 6.2–12)
MUCOUS THREADS URNS QL MICRO: (no result) /HPF
NRBC FLAGGED BY ANALYZER: 0 % (ref 0–5)
PLATELET # BLD: 196 K/MM3 (ref 150–450)
PLATELET COUNT: 196 K/MM3 (ref 150–450)
POSITIVE DIFFERENTIAL: YES
POTASSIUM: 3.5 MMOL/L (ref 3.5–5.1)
PROT UR QL STRIP.AUTO: 100 MG/DL
RBC # BLD AUTO: 2.78 M/MM3 (ref 4.2–5.4)
RBC DISTRIBUTION WIDTH CV: 13.2 % (ref 11.6–14.6)
RBC DISTRIBUTION WIDTH SD: 42.4 FL (ref 35.1–43.9)
RBC MORPH BLD: (no result) NORMAL
RBC UR QL: (no result) /HPF (ref 0–5)
RBC UR QL: 250 /UL
RED CELL MORPHOLOGY: (no result) NORMAL
SCAN SMEAR PER REVIEW CRITERIA: (no result)
SP GR UR: 1.01 (ref 1–1.03)
SQUAMOUS URNS QL MICRO: (no result) /HPF (ref 5–10)
URINE PRESERVATIVE: (no result)
WBC # BLD AUTO: 4.4 K/MM3 (ref 4.4–11)
WHITE BLOOD COUNT: 4.4 K/MM3 (ref 4.4–11)

## 2020-09-22 PROCEDURE — 87086 URINE CULTURE/COLONY COUNT: CPT

## 2020-09-22 PROCEDURE — 99284 EMERGENCY DEPT VISIT MOD MDM: CPT

## 2020-09-22 PROCEDURE — 76830 TRANSVAGINAL US NON-OB: CPT

## 2020-09-22 PROCEDURE — 71046 X-RAY EXAM CHEST 2 VIEWS: CPT

## 2020-09-22 PROCEDURE — A4216 STERILE WATER/SALINE, 10 ML: HCPCS

## 2020-09-22 PROCEDURE — U0003 INFECTIOUS AGENT DETECTION BY NUCLEIC ACID (DNA OR RNA); SEVERE ACUTE RESPIRATORY SYNDROME CORONAVIRUS 2 (SARS-COV-2) (CORONAVIRUS DISEASE [COVID-19]), AMPLIFIED PROBE TECHNIQUE, MAKING USE OF HIGH THROUGHPUT TECHNOLOGIES AS DESCRIBED BY CMS-2020-01-R: HCPCS

## 2020-09-22 PROCEDURE — 80053 COMPREHEN METABOLIC PANEL: CPT

## 2020-09-22 PROCEDURE — 87635 SARS-COV-2 COVID-19 AMP PRB: CPT

## 2020-09-22 PROCEDURE — 80048 BASIC METABOLIC PNL TOTAL CA: CPT

## 2020-09-22 PROCEDURE — 81001 URINALYSIS AUTO W/SCOPE: CPT

## 2020-09-22 PROCEDURE — 85025 COMPLETE CBC W/AUTO DIFF WBC: CPT

## 2020-09-22 RX ADMIN — HYDROMORPHONE HYDROCHLORIDE 0.5 MG: 1 INJECTION, SOLUTION INTRAMUSCULAR; INTRAVENOUS; SUBCUTANEOUS at 18:56

## 2020-09-22 RX ADMIN — SODIUM CHLORIDE 1000 ML: 9 INJECTION, SOLUTION INTRAVENOUS at 17:21

## 2020-09-23 VITALS
DIASTOLIC BLOOD PRESSURE: 64 MMHG | TEMPERATURE: 98.06 F | SYSTOLIC BLOOD PRESSURE: 116 MMHG | HEART RATE: 100 BPM | OXYGEN SATURATION: 97 % | RESPIRATION RATE: 16 BRPM

## 2020-09-23 VITALS
OXYGEN SATURATION: 98 % | HEART RATE: 90 BPM | RESPIRATION RATE: 18 BRPM | TEMPERATURE: 98.42 F | DIASTOLIC BLOOD PRESSURE: 69 MMHG | SYSTOLIC BLOOD PRESSURE: 116 MMHG

## 2020-09-23 VITALS
DIASTOLIC BLOOD PRESSURE: 66 MMHG | TEMPERATURE: 98.96 F | RESPIRATION RATE: 16 BRPM | SYSTOLIC BLOOD PRESSURE: 117 MMHG | HEART RATE: 102 BPM | OXYGEN SATURATION: 98 %

## 2020-09-23 VITALS
DIASTOLIC BLOOD PRESSURE: 70 MMHG | HEART RATE: 107 BPM | SYSTOLIC BLOOD PRESSURE: 108 MMHG | TEMPERATURE: 98.24 F | OXYGEN SATURATION: 97 % | RESPIRATION RATE: 16 BRPM

## 2020-09-23 LAB
ANION GAP: 6 (ref 5–15)
BUN SERPL-MCNC: 11 MG/DL (ref 7–18)
BUN/CREAT RATIO: 13.4 RATIO (ref 10–20)
CALCIUM SERPL-MCNC: 8 MG/DL (ref 8.5–10.1)
CARBON DIOXIDE: 25 MMOL/L (ref 21–32)
CELLS COUNTED: 100
CHLORIDE: 110 MMOL/L (ref 98–107)
DEPRECATED RDW RBC: 43 FL (ref 35.1–43.9)
DIFFERENTIAL INDICATED: MANUAL DIFF
ERYTHROCYTE [DISTWIDTH] IN BLOOD: 13.5 % (ref 11.6–14.6)
EST GLOM FILT RATE - AFR AMER: 108 ML/MIN (ref 60–?)
ESTIMATED CREATININE CLEARANCE: 97.33 ML/MIN
GLUCOSE: 71 MG/DL (ref 74–106)
HCT VFR BLD AUTO: 22.9 % (ref 37–47)
HEMOGLOBIN: 7.7 G/DL (ref 12–15)
HGB BLD-MCNC: 7.7 G/DL (ref 12–15)
MCV RBC: 87.4 FL (ref 81–99)
MEAN CORP HGB CONC: 33.6 G/DL (ref 32–36)
MEAN PLATELET VOL.: 10.7 FL (ref 6.2–12)
NEUTROPHIL-BAND: 4 % (ref 0–5)
NEUTROPHIL-SEGMENTED: 67 % (ref 47–70)
PATHOLOGY SPECIMEN OB: (no result)
PLATELET # BLD: 178 K/MM3 (ref 150–450)
PLATELET COUNT: 178 K/MM3 (ref 150–450)
POSITIVE COUNT: YES
POSITIVE MORPHOLOGY: YES
POTASSIUM: 3.7 MMOL/L (ref 3.5–5.1)
RBC # BLD AUTO: 2.62 M/MM3 (ref 4.2–5.4)
RBC DISTRIBUTION WIDTH CV: 13.5 % (ref 11.6–14.6)
RBC DISTRIBUTION WIDTH SD: 43 FL (ref 35.1–43.9)
RBC MORPH BLD: (no result) NORMAL
RED CELL MORPHOLOGY: (no result) NORMAL
TOTAL CELLS COUNTED: 100
WBC # BLD AUTO: 3.9 K/MM3 (ref 4.4–11)
WHITE BLOOD COUNT: 3.9 K/MM3 (ref 4.4–11)

## 2020-09-23 RX ADMIN — BETA CAROTENE, CHOLECALCIFEROL, DL-ALPHA TOCOPHERYL ACETATE, ASCORBIC ACID, FOLIC ACID, THIAMIN MONONITRATE, RIBOFLAVIN, NIACINAMIDE, PYRIDOXINE HYDROCHLORIDE, CYANOCOBALAMIN, CALCIUM CARBONATE, POTAS 1 TABLET: 120; 4000; 200; 400; 8; 29; 1; 20; 150; 3; 3; 3; 15 TABLET, FILM COATED ORAL at 09:20

## 2020-09-23 RX ADMIN — DOCUSATE SODIUM 50 MG AND SENNOSIDES 8.6 MG 1 TABLET: 8.6; 5 TABLET, FILM COATED ORAL at 09:27

## 2020-09-23 RX ADMIN — SODIUM CHLORIDE, PRESERVATIVE FREE 10 ML: 5 INJECTION INTRAVENOUS at 11:01

## 2020-09-23 RX ADMIN — SODIUM CHLORIDE, PRESERVATIVE FREE 10 ML: 5 INJECTION INTRAVENOUS at 00:15

## 2020-09-23 RX ADMIN — SODIUM CHLORIDE, PRESERVATIVE FREE 10 ML: 5 INJECTION INTRAVENOUS at 05:48

## 2020-09-23 RX ADMIN — SODIUM CHLORIDE, PRESERVATIVE FREE 10 ML: 5 INJECTION INTRAVENOUS at 12:53

## 2020-09-23 RX ADMIN — SODIUM CHLORIDE, PRESERVATIVE FREE 10 ML: 5 INJECTION INTRAVENOUS at 14:55

## 2020-09-23 RX ADMIN — SODIUM CHLORIDE, PRESERVATIVE FREE 10 ML: 5 INJECTION INTRAVENOUS at 17:09

## 2020-10-06 ENCOUNTER — HOSPITAL ENCOUNTER (EMERGENCY)
Age: 25
Discharge: HOME | End: 2020-10-06
Payer: COMMERCIAL

## 2020-10-06 VITALS
DIASTOLIC BLOOD PRESSURE: 69 MMHG | RESPIRATION RATE: 18 BRPM | SYSTOLIC BLOOD PRESSURE: 120 MMHG | TEMPERATURE: 97.3 F | HEART RATE: 94 BPM | OXYGEN SATURATION: 99 %

## 2020-10-06 VITALS
DIASTOLIC BLOOD PRESSURE: 72 MMHG | RESPIRATION RATE: 18 BRPM | HEART RATE: 75 BPM | OXYGEN SATURATION: 100 % | SYSTOLIC BLOOD PRESSURE: 118 MMHG | TEMPERATURE: 97.34 F

## 2020-10-06 VITALS — DIASTOLIC BLOOD PRESSURE: 82 MMHG | RESPIRATION RATE: 16 BRPM | HEART RATE: 83 BPM | SYSTOLIC BLOOD PRESSURE: 116 MMHG

## 2020-10-06 VITALS — OXYGEN SATURATION: 98 %

## 2020-10-06 VITALS — BODY MASS INDEX: 30.7 KG/M2 | BODY MASS INDEX: 34.7 KG/M2

## 2020-10-06 DIAGNOSIS — R07.89: Primary | ICD-10-CM

## 2020-10-06 DIAGNOSIS — R06.00: ICD-10-CM

## 2020-10-06 LAB
ANION GAP: 6 (ref 5–15)
BUN SERPL-MCNC: 19 MG/DL (ref 7–18)
BUN/CREAT RATIO: 20.7 RATIO (ref 10–20)
CALCIUM SERPL-MCNC: 9.2 MG/DL (ref 8.5–10.1)
CARBON DIOXIDE: 28 MMOL/L (ref 21–32)
CHLORIDE: 103 MMOL/L (ref 98–107)
DEPRECATED RDW RBC: 43 FL (ref 35.1–43.9)
ERYTHROCYTE [DISTWIDTH] IN BLOOD: 13.2 % (ref 11.6–14.6)
EST GLOM FILT RATE - AFR AMER: 95 ML/MIN (ref 60–?)
ESTIMATED CREATININE CLEARANCE: 86.75 ML/MIN
GLUCOSE: 91 MG/DL (ref 74–106)
HCT VFR BLD AUTO: 35.4 % (ref 37–47)
HEMOGLOBIN: 11.1 G/DL (ref 12–15)
HGB BLD-MCNC: 11.1 G/DL (ref 12–15)
IMMATURE GRANULOCYTES COUNT: 0.11 X10^3/UL (ref 0–0)
MCV RBC: 88.7 FL (ref 81–99)
MEAN CORP HGB CONC: 31.4 G/DL (ref 32–36)
MEAN PLATELET VOL.: 10.2 FL (ref 6.2–12)
NRBC FLAGGED BY ANALYZER: 0 % (ref 0–5)
PLATELET # BLD: 513 K/MM3 (ref 150–450)
PLATELET COUNT: 513 K/MM3 (ref 150–450)
POTASSIUM: 3.9 MMOL/L (ref 3.5–5.1)
RBC # BLD AUTO: 3.99 M/MM3 (ref 4.2–5.4)
RBC DISTRIBUTION WIDTH CV: 13.2 % (ref 11.6–14.6)
RBC DISTRIBUTION WIDTH SD: 43 FL (ref 35.1–43.9)
WBC # BLD AUTO: 9.3 K/MM3 (ref 4.4–11)
WHITE BLOOD COUNT: 9.3 K/MM3 (ref 4.4–11)

## 2020-10-06 PROCEDURE — 84484 ASSAY OF TROPONIN QUANT: CPT

## 2020-10-06 PROCEDURE — 93005 ELECTROCARDIOGRAM TRACING: CPT

## 2020-10-06 PROCEDURE — 71275 CT ANGIOGRAPHY CHEST: CPT

## 2020-10-06 PROCEDURE — 80048 BASIC METABOLIC PNL TOTAL CA: CPT

## 2020-10-06 PROCEDURE — 85025 COMPLETE CBC W/AUTO DIFF WBC: CPT

## 2020-10-06 PROCEDURE — 99284 EMERGENCY DEPT VISIT MOD MDM: CPT

## 2023-03-10 ENCOUNTER — OFFICE VISIT (OUTPATIENT)
Dept: PRIMARY CARE | Facility: CLINIC | Age: 28
End: 2023-03-10
Payer: COMMERCIAL

## 2023-03-10 VITALS
HEIGHT: 66 IN | DIASTOLIC BLOOD PRESSURE: 79 MMHG | BODY MASS INDEX: 31.66 KG/M2 | SYSTOLIC BLOOD PRESSURE: 125 MMHG | WEIGHT: 197 LBS | HEART RATE: 84 BPM

## 2023-03-10 DIAGNOSIS — K21.9 GASTROESOPHAGEAL REFLUX DISEASE WITHOUT ESOPHAGITIS: ICD-10-CM

## 2023-03-10 DIAGNOSIS — Z00.00 HEALTH MAINTENANCE EXAMINATION: ICD-10-CM

## 2023-03-10 DIAGNOSIS — J45.21 MILD INTERMITTENT ASTHMA WITH ACUTE EXACERBATION (HHS-HCC): Primary | ICD-10-CM

## 2023-03-10 PROCEDURE — 1036F TOBACCO NON-USER: CPT | Performed by: INTERNAL MEDICINE

## 2023-03-10 PROCEDURE — 99204 OFFICE O/P NEW MOD 45 MIN: CPT | Performed by: INTERNAL MEDICINE

## 2023-03-10 RX ORDER — ALBUTEROL SULFATE 90 UG/1
2 AEROSOL, METERED RESPIRATORY (INHALATION) EVERY 4 HOURS PRN
Qty: 8 G | Refills: 5 | Status: SHIPPED | OUTPATIENT
Start: 2023-03-10 | End: 2024-03-09

## 2023-03-10 RX ORDER — OMEPRAZOLE 40 MG/1
40 CAPSULE, DELAYED RELEASE ORAL
Qty: 30 CAPSULE | Refills: 11 | Status: SHIPPED | OUTPATIENT
Start: 2023-03-10 | End: 2024-03-09

## 2023-03-10 ASSESSMENT — ENCOUNTER SYMPTOMS
FLANK PAIN: 0
APPETITE CHANGE: 0
SINUS PRESSURE: 0
FATIGUE: 0
SORE THROAT: 0
CHILLS: 0
EYE DISCHARGE: 0
SINUS PAIN: 0
DIFFICULTY URINATING: 0
DIARRHEA: 1
STRIDOR: 0
VOMITING: 1
ABDOMINAL PAIN: 1
PALPITATIONS: 0
ACTIVITY CHANGE: 0
BACK PAIN: 0
ARTHRALGIAS: 0
NAUSEA: 1
FREQUENCY: 0
SHORTNESS OF BREATH: 0
FEVER: 0
WHEEZING: 0

## 2023-03-10 NOTE — ASSESSMENT & PLAN NOTE
- suspect acid reflux, pain not persistent so do not think a GI eval with endoscopy is indicated, if worsens with daily medication would recommend   - continue dietary changes   - discussed taking omeprazole is she knows she is going to have alcohol, or spicy food etc to hopefully prevent it

## 2023-03-10 NOTE — PROGRESS NOTES
"Subjective   Patient ID: Karime Gomez is a 27 y.o. female who presents for Annual Exam and Abdominal Pain (Present since 02/21/23).  Abdominal Pain  Associated symptoms include diarrhea, nausea and vomiting. Pertinent negatives include no arthralgias, fever or frequency.     Patient reports that she has not seen a PCP in many years.  She reports that she developed pain on 2/21/23, she had had some alcohol that night and she had intense pain right between her breasts, she took 2-3 tums and that helped. She still had some pain for about the next week and then changed her diet. Patient stopped eating spicy foods, is trying to loose weight, doing some keto. Watching her caffeine intake.   She states that she had nausea, vomiting and diarrhea also.    Review of Systems   Constitutional:  Negative for activity change, appetite change, chills, fatigue and fever.   HENT:  Negative for congestion, sinus pressure, sinus pain, sneezing and sore throat.    Eyes:  Negative for discharge.   Respiratory:  Negative for shortness of breath, wheezing and stridor.    Cardiovascular:  Negative for chest pain and palpitations.   Gastrointestinal:  Positive for abdominal pain, diarrhea, nausea and vomiting.   Endocrine: Negative for cold intolerance.   Genitourinary:  Negative for difficulty urinating, dyspareunia, flank pain and frequency.   Musculoskeletal:  Negative for arthralgias and back pain.       Objective   /79 (BP Location: Right arm, Patient Position: Sitting, BP Cuff Size: Adult)   Pulse 84   Ht 1.676 m (5' 6\")   Wt 89.4 kg (197 lb)   BMI 31.80 kg/m²     Physical Exam  Constitutional:       General: She is not in acute distress.     Appearance: Normal appearance. She is not toxic-appearing.   HENT:      Head: Normocephalic and atraumatic.      Right Ear: Tympanic membrane, ear canal and external ear normal.      Left Ear: Tympanic membrane, ear canal and external ear normal.      Nose: Nose normal.      " Mouth/Throat:      Mouth: Mucous membranes are moist.      Pharynx: Oropharynx is clear.   Eyes:      Extraocular Movements: Extraocular movements intact.      Conjunctiva/sclera: Conjunctivae normal.      Pupils: Pupils are equal, round, and reactive to light.   Cardiovascular:      Rate and Rhythm: Normal rate and regular rhythm.      Heart sounds: No murmur heard.     No friction rub. No gallop.   Pulmonary:      Effort: Pulmonary effort is normal.      Breath sounds: Normal breath sounds.   Abdominal:      General: Bowel sounds are normal. There is no distension.      Palpations: Abdomen is soft. There is no mass.      Tenderness: There is no abdominal tenderness. There is no guarding.   Musculoskeletal:         General: No swelling. Normal range of motion.      Cervical back: Normal range of motion.   Skin:     General: Skin is warm and dry.   Neurological:      General: No focal deficit present.      Mental Status: She is alert and oriented to person, place, and time.   Psychiatric:         Mood and Affect: Mood normal.         Thought Content: Thought content normal.         Judgment: Judgment normal.         Assessment/Plan   Problem List Items Addressed This Visit          Respiratory    Mild intermittent asthma with acute exacerbation - Primary     - exercise induced   - sent albuterol prn          Relevant Medications    albuterol (Ventolin HFA) 90 mcg/actuation inhaler       Digestive    GERD (gastroesophageal reflux disease)     - suspect acid reflux, pain not persistent so do not think a GI eval with endoscopy is indicated, if worsens with daily medication would recommend   - continue dietary changes   - discussed taking omeprazole is she knows she is going to have alcohol, or spicy food etc to hopefully prevent it          Relevant Medications    omeprazole (PriLOSEC) 40 mg DR capsule     Other Visit Diagnoses       Health maintenance examination        Relevant Orders    Comprehensive Metabolic Panel     Lipid Panel        Recommend setting appt with gyn to get up to date on pap,.

## 2024-03-07 ENCOUNTER — OFFICE VISIT (OUTPATIENT)
Dept: PRIMARY CARE | Facility: CLINIC | Age: 29
End: 2024-03-07
Payer: COMMERCIAL

## 2024-03-07 VITALS
HEIGHT: 66 IN | WEIGHT: 196 LBS | DIASTOLIC BLOOD PRESSURE: 75 MMHG | HEART RATE: 78 BPM | SYSTOLIC BLOOD PRESSURE: 126 MMHG | BODY MASS INDEX: 31.5 KG/M2

## 2024-03-07 DIAGNOSIS — J45.21 MILD INTERMITTENT ASTHMA WITH ACUTE EXACERBATION (HHS-HCC): ICD-10-CM

## 2024-03-07 DIAGNOSIS — K21.9 GASTROESOPHAGEAL REFLUX DISEASE WITHOUT ESOPHAGITIS: ICD-10-CM

## 2024-03-07 DIAGNOSIS — E66.9 OBESITY (BMI 30-39.9): Primary | ICD-10-CM

## 2024-03-07 PROCEDURE — 99214 OFFICE O/P EST MOD 30 MIN: CPT | Performed by: INTERNAL MEDICINE

## 2024-03-07 PROCEDURE — 1036F TOBACCO NON-USER: CPT | Performed by: INTERNAL MEDICINE

## 2024-03-07 ASSESSMENT — ENCOUNTER SYMPTOMS
VOMITING: 0
SINUS PRESSURE: 0
SHORTNESS OF BREATH: 0
SORE THROAT: 0
FATIGUE: 0
APPETITE CHANGE: 0
ABDOMINAL DISTENTION: 0
SINUS PAIN: 0
WEAKNESS: 0
WHEEZING: 0
ACTIVITY CHANGE: 0
BACK PAIN: 0
DIARRHEA: 0
CHILLS: 0
COUGH: 0
NAUSEA: 0
NUMBNESS: 0

## 2024-03-07 NOTE — PROGRESS NOTES
"Subjective   Patient ID: Karime Gomez is a 28 y.o. female who presents for Annual Exam.  HPI  Patient is here today for annual visit.     Patient started a diet, has lost about 10 lbs so far.   Trying to do 1500 calories a day and high intensity workout.     Review of Systems   Constitutional:  Negative for activity change, appetite change, chills and fatigue.   HENT:  Negative for congestion, postnasal drip, sinus pressure, sinus pain and sore throat.    Respiratory:  Negative for cough, shortness of breath and wheezing.    Cardiovascular:  Negative for chest pain and leg swelling.   Gastrointestinal:  Negative for abdominal distention, diarrhea, nausea and vomiting.   Musculoskeletal:  Negative for back pain.   Neurological:  Negative for weakness and numbness.       Objective   /75   Pulse 78   Ht 1.676 m (5' 6\")   Wt 88.9 kg (196 lb)   BMI 31.64 kg/m²     Physical Exam  Constitutional:       General: She is not in acute distress.     Appearance: Normal appearance.   HENT:      Head: Normocephalic.      Nose: Nose normal.      Mouth/Throat:      Pharynx: No oropharyngeal exudate.   Eyes:      General:         Right eye: No discharge.         Left eye: No discharge.      Extraocular Movements: Extraocular movements intact.      Pupils: Pupils are equal, round, and reactive to light.   Cardiovascular:      Rate and Rhythm: Normal rate and regular rhythm.      Heart sounds: No murmur heard.     No gallop.   Pulmonary:      Effort: Pulmonary effort is normal. No respiratory distress.      Breath sounds: Normal breath sounds. No wheezing.   Musculoskeletal:         General: No swelling. Normal range of motion.   Skin:     General: Skin is warm and dry.      Coloration: Skin is not jaundiced.   Neurological:      General: No focal deficit present.      Mental Status: She is alert and oriented to person, place, and time.      Cranial Nerves: No cranial nerve deficit.   Psychiatric:         Mood and " Affect: Mood normal.         Behavior: Behavior normal.       Pap smear - years ago       Assessment/Plan   Problem List Items Addressed This Visit       GERD (gastroesophageal reflux disease)    Mild intermittent asthma with acute exacerbation     Other Visit Diagnoses       Obesity (BMI 30-39.9)    -  Primary    Relevant Orders    Referral to Nutrition Services    TSH with reflex to Free T4 if abnormal          Obesity, BMI 31   - she is doing calorie restriction an d working out   - recommend continue and re-evaluate in 3 mo and can discuss weight loss medications     2. Tonsil stones  - advised only treatment of is removing then, declines ENT referral for now   Final diagnoses:   [E66.9] Obesity (BMI 30-39.9)   [J45.21] Mild intermittent asthma with acute exacerbation   [K21.9] Gastroesophageal reflux disease without esophagitis

## 2024-06-06 ENCOUNTER — OFFICE VISIT (OUTPATIENT)
Dept: PRIMARY CARE | Facility: CLINIC | Age: 29
End: 2024-06-06
Payer: COMMERCIAL

## 2024-06-06 VITALS
SYSTOLIC BLOOD PRESSURE: 115 MMHG | HEART RATE: 77 BPM | DIASTOLIC BLOOD PRESSURE: 77 MMHG | HEIGHT: 66 IN | WEIGHT: 178 LBS | BODY MASS INDEX: 28.61 KG/M2

## 2024-06-06 DIAGNOSIS — E66.1 CLASS 1 DRUG-INDUCED OBESITY WITH SERIOUS COMORBIDITY AND BODY MASS INDEX (BMI) OF 30.0 TO 30.9 IN ADULT: Primary | ICD-10-CM

## 2024-06-06 PROCEDURE — 99213 OFFICE O/P EST LOW 20 MIN: CPT | Performed by: INTERNAL MEDICINE

## 2024-06-06 PROCEDURE — 1036F TOBACCO NON-USER: CPT | Performed by: INTERNAL MEDICINE

## 2024-06-06 PROCEDURE — 3008F BODY MASS INDEX DOCD: CPT | Performed by: INTERNAL MEDICINE

## 2024-06-06 RX ORDER — BUPROPION HYDROCHLORIDE 150 MG/1
150 TABLET ORAL EVERY MORNING
Qty: 30 TABLET | Refills: 1 | Status: SHIPPED | OUTPATIENT
Start: 2024-06-06 | End: 2024-12-03

## 2024-06-06 NOTE — PROGRESS NOTES
"Subjective   Patient ID: Karime Gmoez is a 28 y.o. female who presents for Follow-up (3 month).  HPI  Patient is here today for 3 mo follow up     Pt is down 20 lbs since her last visit.   She has been continuing to do calorie restriction and exercise.     Review of Systems   Cardiovascular:  Negative for chest pain and leg swelling.       Objective   /77   Pulse 77   Ht 1.676 m (5' 6\")   Wt 80.7 kg (178 lb)   BMI 28.73 kg/m²     Physical Exam  Constitutional:       General: She is not in acute distress.     Appearance: Normal appearance.   HENT:      Head: Normocephalic.   Neurological:      Mental Status: She is alert.   Psychiatric:         Mood and Affect: Mood normal.         Behavior: Behavior normal.           Assessment/Plan   Problem List Items Addressed This Visit    None  Visit Diagnoses       Class 1 drug-induced obesity with serious comorbidity and body mass index (BMI) of 30.0 to 30.9 in adult    -  Primary    Relevant Medications    buPROPion XL (Wellbutrin XL) 150 mg 24 hr tablet        Obesity, BMI 28  - she is doing calorie restriction and working out, continue   - has lost 20 lbs with above   - will try wellbutrin 150mg po daily        Final diagnoses:   [E66.1, Z68.30] Class 1 drug-induced obesity with serious comorbidity and body mass index (BMI) of 30.0 to 30.9 in adult     "

## 2024-09-03 ENCOUNTER — TELEPHONE (OUTPATIENT)
Dept: PRIMARY CARE | Facility: CLINIC | Age: 29
End: 2024-09-03
Payer: COMMERCIAL

## 2024-09-03 DIAGNOSIS — Z00.00 WELLNESS EXAMINATION: Primary | ICD-10-CM

## 2024-09-03 NOTE — TELEPHONE ENCOUNTER
Pt sent an email with papers that need filled out for insurance. They want routine basic labs done. I told the pt once she does them then we'll give you the paper to sign and fax it to whoever.

## 2024-09-06 ENCOUNTER — LAB (OUTPATIENT)
Dept: LAB | Facility: LAB | Age: 29
End: 2024-09-06
Payer: COMMERCIAL

## 2024-09-06 DIAGNOSIS — Z00.00 WELLNESS EXAMINATION: ICD-10-CM

## 2024-09-06 DIAGNOSIS — E66.9 OBESITY (BMI 30-39.9): ICD-10-CM

## 2024-09-06 LAB
ALBUMIN SERPL BCP-MCNC: 4.3 G/DL (ref 3.4–5)
ALP SERPL-CCNC: 49 U/L (ref 33–110)
ALT SERPL W P-5'-P-CCNC: 20 U/L (ref 7–45)
ANION GAP SERPL CALC-SCNC: 11 MMOL/L (ref 10–20)
AST SERPL W P-5'-P-CCNC: 17 U/L (ref 9–39)
BILIRUB SERPL-MCNC: 0.5 MG/DL (ref 0–1.2)
BUN SERPL-MCNC: 7 MG/DL (ref 6–23)
CALCIUM SERPL-MCNC: 9.1 MG/DL (ref 8.6–10.3)
CHLORIDE SERPL-SCNC: 105 MMOL/L (ref 98–107)
CHOLEST SERPL-MCNC: 180 MG/DL (ref 0–199)
CHOLESTEROL/HDL RATIO: 3.7
CO2 SERPL-SCNC: 27 MMOL/L (ref 21–32)
CREAT SERPL-MCNC: 0.88 MG/DL (ref 0.5–1.05)
EGFRCR SERPLBLD CKD-EPI 2021: >90 ML/MIN/1.73M*2
GLUCOSE SERPL-MCNC: 92 MG/DL (ref 74–99)
HDLC SERPL-MCNC: 49 MG/DL
LDLC SERPL CALC-MCNC: 106 MG/DL
NON HDL CHOLESTEROL: 131 MG/DL (ref 0–149)
POTASSIUM SERPL-SCNC: 4.2 MMOL/L (ref 3.5–5.3)
PROT SERPL-MCNC: 6.5 G/DL (ref 6.4–8.2)
SODIUM SERPL-SCNC: 139 MMOL/L (ref 136–145)
TRIGL SERPL-MCNC: 127 MG/DL (ref 0–149)
TSH SERPL-ACNC: 1.2 MIU/L (ref 0.44–3.98)
VLDL: 25 MG/DL (ref 0–40)

## 2024-09-06 PROCEDURE — 84443 ASSAY THYROID STIM HORMONE: CPT

## 2024-09-06 PROCEDURE — 36415 COLL VENOUS BLD VENIPUNCTURE: CPT

## 2024-09-06 PROCEDURE — 80053 COMPREHEN METABOLIC PANEL: CPT

## 2024-09-06 PROCEDURE — 80061 LIPID PANEL: CPT

## 2024-09-26 ENCOUNTER — APPOINTMENT (OUTPATIENT)
Dept: PRIMARY CARE | Facility: CLINIC | Age: 29
End: 2024-09-26
Payer: COMMERCIAL

## 2024-09-26 DIAGNOSIS — E66.9 OBESITY (BMI 30-39.9): Primary | ICD-10-CM

## 2024-09-26 DIAGNOSIS — Z79.899 MEDICATION MANAGEMENT: ICD-10-CM

## 2024-09-26 PROCEDURE — 1036F TOBACCO NON-USER: CPT | Performed by: INTERNAL MEDICINE

## 2024-09-26 PROCEDURE — 99213 OFFICE O/P EST LOW 20 MIN: CPT | Performed by: INTERNAL MEDICINE

## 2024-09-26 RX ORDER — PHENTERMINE HYDROCHLORIDE 37.5 MG/1
37.5 TABLET ORAL
Qty: 30 TABLET | Refills: 0 | Status: SHIPPED | OUTPATIENT
Start: 2024-09-26

## 2024-09-26 NOTE — PROGRESS NOTES
Subjective   Patient ID: Karime Gomez is a 29 y.o. female who presents for No chief complaint on file..  HPI  Patient is here today for follow up via telehealth.     Patient and physician are at two separate locations .  Pt and physician are at two separate locastions     Pt did not tolerate the wellbutrin, tried for 2 weeks, and gave her severe headaches.     Pt asking about adipex.     Discussed potential side effects and risk benefits of the medication.  Discussed that it requires  CSA and UDS which can do at next  appt.   Pt agreeable.     Review of Systems   Cardiovascular:  Negative for chest pain.       Objective   There were no vitals taken for this visit.    Physical Exam  No physical exam was completed due to virtual visit.       Assessment/Plan   Problem List Items Addressed This Visit    None  Visit Diagnoses       Obesity (BMI 30-39.9)    -  Primary    Relevant Medications    phentermine (Adipex-P) 37.5 mg tablet    Medication management              Obesity, BMI 28  - she is doing calorie restriction and working out, continue   - has lost 20 lbs with above   - wellbutrin gave severe headaches   - discussed risk and benefits of adipex, discussed side effects   - will do Csa and UDS next appt   - sent adipex   - I have personally reviewed this patient's OARRS report and found it to be appropriate. This has vipul uploaded into the medical record. I have considered the risks of abuse, addiction, dependency and diversion and feel that it is clinically appropriate for this patient to be prescribed this controlled substance medication.        Final diagnoses:   [E66.9] Obesity (BMI 30-39.9)   [Z79.899] Medication management

## 2024-10-30 ENCOUNTER — APPOINTMENT (OUTPATIENT)
Dept: PRIMARY CARE | Facility: CLINIC | Age: 29
End: 2024-10-30
Payer: COMMERCIAL

## 2024-10-30 VITALS
HEIGHT: 66 IN | WEIGHT: 174 LBS | DIASTOLIC BLOOD PRESSURE: 76 MMHG | SYSTOLIC BLOOD PRESSURE: 110 MMHG | BODY MASS INDEX: 27.97 KG/M2 | HEART RATE: 101 BPM

## 2024-10-30 DIAGNOSIS — E66.9 OBESITY (BMI 30-39.9): ICD-10-CM

## 2024-10-30 DIAGNOSIS — Z79.899 CONTROLLED SUBSTANCE AGREEMENT SIGNED: Primary | ICD-10-CM

## 2024-10-30 PROCEDURE — 3008F BODY MASS INDEX DOCD: CPT | Performed by: INTERNAL MEDICINE

## 2024-10-30 PROCEDURE — 99213 OFFICE O/P EST LOW 20 MIN: CPT | Performed by: INTERNAL MEDICINE

## 2024-10-30 RX ORDER — PHENTERMINE HYDROCHLORIDE 37.5 MG/1
37.5 TABLET ORAL
Qty: 30 TABLET | Refills: 0 | Status: SHIPPED | OUTPATIENT
Start: 2024-10-30

## 2024-10-31 LAB
AMPHETAMINES UR QL SCN: ABNORMAL
BARBITURATES UR QL SCN: ABNORMAL
BENZODIAZ UR QL SCN: ABNORMAL
BZE UR QL SCN: ABNORMAL
CANNABINOIDS UR QL SCN: ABNORMAL
FENTANYL+NORFENTANYL UR QL SCN: ABNORMAL
METHADONE UR QL SCN: ABNORMAL
OPIATES UR QL SCN: ABNORMAL
OXYCODONE+OXYMORPHONE UR QL SCN: ABNORMAL
PCP UR QL SCN: ABNORMAL

## 2024-11-03 LAB
AMPHET UR-MCNC: <50 NG/ML
MDA UR-MCNC: <200 NG/ML
MDEA UR-MCNC: <200 NG/ML
MDMA UR-MCNC: <200 NG/ML
METHAMPHET UR-MCNC: <200 NG/ML
PHENTERMINE UR CFM-MCNC: >5000 NG/ML

## 2024-12-05 ENCOUNTER — APPOINTMENT (OUTPATIENT)
Dept: PRIMARY CARE | Facility: CLINIC | Age: 29
End: 2024-12-05
Payer: COMMERCIAL

## 2024-12-05 DIAGNOSIS — E66.9 OBESITY (BMI 30-39.9): ICD-10-CM

## 2024-12-05 PROCEDURE — 1036F TOBACCO NON-USER: CPT | Performed by: INTERNAL MEDICINE

## 2024-12-05 PROCEDURE — 99213 OFFICE O/P EST LOW 20 MIN: CPT | Performed by: INTERNAL MEDICINE

## 2024-12-05 RX ORDER — PHENTERMINE HYDROCHLORIDE 37.5 MG/1
37.5 TABLET ORAL
Qty: 30 TABLET | Refills: 0 | Status: SHIPPED | OUTPATIENT
Start: 2024-12-05

## 2024-12-05 NOTE — PROGRESS NOTES
Subjective   Patient ID: Karime Gomez is a 29 y.o. female who presents for No chief complaint on file..  HPI  Patient is here today for 1 mo follow up     Pt and physician are at two separate locations.  Pt was verbally consented for the encounter.     Pt reports that her weight today was 168 lbs.    Has lost 10 lbs after 1 mo of adipex    No side effects other than dry mouth, more sensitive to caffeine.       Review of Systems   Cardiovascular:  Negative for chest pain and leg swelling.       Objective   There were no vitals taken for this visit.    Physical Exam  NO physical exam was completed    OARRS:  Alayna Batres DO on 10/30/2024  2:00 PM  I have personally reviewed the OARRS report for Karime Gomez. I have considered the risks of abuse, dependence, addiction and diversion and I believe that it is clinically appropriate for Karime Gomez to be prescribed this medication     Is the patient prescribed a combination of a benzodiazepine and opioid?  No     Last Urine Drug Screen / ordered today: Yes        Component  Ref Range & Units 1 mo ago   Amphetamine Screen, Urine  Presumptive Negative Presumptive Positive Abnormal    Comment: CUTOFF LEVEL: 500 NG/ML  Cross-reactivity has been reported with high concentrations  of the following drugs: buproprion, chloroquine, chlorpromazine,  ephedrine, mephentermine, fenfluramine, phentermine,  phenylpropanolamine, pseudoephedrine, and propranolol.   Barbiturate Screen, Urine  Presumptive Negative Presumptive Negative   Comment: CUTOFF LEVEL: 200 NG/ML   Benzodiazepines Screen, Urine  Presumptive Negative Presumptive Negative   Comment: CUTOFF LEVEL: 200 NG/ML   Cannabinoid Screen, Urine  Presumptive Negative Presumptive Negative   Comment: CUTOFF LEVEL: 50 NG/ML   Cocaine Metabolite Screen, Urine  Presumptive Negative Presumptive Negative   Comment: CUTOFF LEVEL: 150 NG/ML   Fentanyl Screen, Urine  Presumptive Negative Presumptive Negative    Comment: CUTOFF LEVEL: 5 NG/ML   Opiate Screen, Urine  Presumptive Negative Presumptive Negative   Comment: CUTOFF LEVEL: 300 NG/ML  The opiate screen does not detect fentanyl, meperidine, or  tramadol. Oxycodone is not consistently detected (refer to  Oxycodone Screen, Urine result).   Oxycodone Screen, Urine  Presumptive Negative Presumptive Negative   Comment: CUTOFF LEVEL: 100 NG/ML  This test will accurately detect both oxycodone and oxymorphone.   PCP Screen, Urine  Presumptive Negative Presumptive Negative   Comment: CUTOFF LEVEL:  25 NG/ML  Cross-reactivity has been reported with dextromethorphan.   Methadone Screen, Urine  Presumptive Negative Presumptive Negative   Comment: CUTOFF LEVEL: 150 NG/ML  The metabolite L-alpha-acetylmethadol (LAAM) is not  detected by this method in concentrations that would  be found in the urine of patients on LAAM therapy.   Resulting Agency [facility]              Narrative  Controlled Substance Agreement:  Date of the Last Agreement: 10/30/2024  Reviewed Controlled Substance Agreement including but not limited to the benefits, risks, and alternatives to treatment with a Controlled Substance medication(s).     Anorexiants:   What is the patient's goal of therapy? Weight loss      Is this being achieved with current treatment? yes     I have assessed the patient's continuing efforts to lose weight., I have assessed the patient's dedication to the treatment program and the response to treatment., and I have assessed the presence or absence of contraindications, adverse effects, and indicators of possible substance abuse that would necessitate cessation of treatment utilizing controlled substance.     Patient has demonstrated continued efforts to lose weight, is dedicated to the treatment program and the response to treatment. and I have assessed for the presence or absence of contraindications, adverse effects, and indicators of possible substance abuse that would necessitate  cessation of treatment utilizing controlled substance.     Activities of Daily Living:   Is your overall impression that this patient is benefiting (symptom reduction outweighs side effects) from anorexiants therapy? Yes      1. Physical Functioning: Better  2. Family Relationship: Better  3. Social Relationship: Better  4. Mood: Better  5. Sleep Patterns: Better  6. Overall Function: Better     Assessment/Plan   Problem List Items Addressed This Visit    None  Visit Diagnoses       Obesity (BMI 30-39.9)        Relevant Medications    phentermine (Adipex-P) 37.5 mg tablet          Obesity, BMI 28  - she is doing calorie restriction and working out but weight had platuaed after losing 20 lbs on her own   - down 10 lbs after month #2 of adipex   - wellbutrin gave severe headaches   - discussed risk and benefits of adipex, discussed side effects   - signed csa and obtained uds and as expected   - sent adipex #3  - I have personally reviewed this patient's OARRS report and found it to be appropriate. This has vipul uploaded into the medical record. I have considered the risks of abuse, addiction, dependency and diversion and feel that it is clinically appropriate for this patient to be prescribed this controlled substance medication.        Advised pt that I will be leaving  in March of 2025, will follow up in 1 mo   Final diagnoses:   [E66.9] Obesity (BMI 30-39.9)

## 2025-01-16 ENCOUNTER — APPOINTMENT (OUTPATIENT)
Dept: PRIMARY CARE | Facility: CLINIC | Age: 30
End: 2025-01-16
Payer: COMMERCIAL

## 2025-01-16 VITALS
HEIGHT: 66 IN | BODY MASS INDEX: 26.36 KG/M2 | WEIGHT: 164 LBS | HEART RATE: 103 BPM | SYSTOLIC BLOOD PRESSURE: 128 MMHG | DIASTOLIC BLOOD PRESSURE: 74 MMHG

## 2025-01-16 DIAGNOSIS — Z79.899 MEDICATION MANAGEMENT: Primary | ICD-10-CM

## 2025-01-16 DIAGNOSIS — E66.9 OBESITY (BMI 30-39.9): ICD-10-CM

## 2025-01-16 PROCEDURE — 99213 OFFICE O/P EST LOW 20 MIN: CPT | Performed by: INTERNAL MEDICINE

## 2025-01-16 PROCEDURE — 3008F BODY MASS INDEX DOCD: CPT | Performed by: INTERNAL MEDICINE

## 2025-01-16 RX ORDER — PHENTERMINE HYDROCHLORIDE 37.5 MG/1
37.5 TABLET ORAL
Qty: 30 TABLET | Refills: 0 | Status: SHIPPED | OUTPATIENT
Start: 2025-01-16

## 2025-01-16 NOTE — PROGRESS NOTES
"Subjective   Patient ID: Karime Gomez is a 29 y.o. female who presents for Follow-up (MONTH 3 ADIPEX).  HPI  Patient is here today for 3 mo follow up     Pt had gotten sick with a sinus infection and had  forgotten to restart her medication, down 4 lbs since her last appt.     Review of Systems   Cardiovascular:  Negative for chest pain and leg swelling.       Objective   /74 (BP Location: Right arm, Patient Position: Sitting, BP Cuff Size: Adult)   Pulse 103   Ht 1.676 m (5' 6\")   Wt 74.4 kg (164 lb)   BMI 26.47 kg/m²     Physical Exam  Constitutional:       Appearance: Normal appearance.   Neurological:      Mental Status: She is alert.   Psychiatric:         Mood and Affect: Mood normal.         Behavior: Behavior normal.         Thought Content: Thought content normal.         OARRS:  Alayna Batres DO on 10/30/2024  2:00 PM  I have personally reviewed the OARRS report for Karime Gomez. I have considered the risks of abuse, dependence, addiction and diversion and I believe that it is clinically appropriate for Karime Gomez to be prescribed this medication     Is the patient prescribed a combination of a benzodiazepine and opioid?  No     Last Urine Drug Screen / ordered today: Yes           Component  Ref Range & Units 1 mo ago   Amphetamine Screen, Urine  Presumptive Negative Presumptive Positive Abnormal    Comment: CUTOFF LEVEL: 500 NG/ML  Cross-reactivity has been reported with high concentrations  of the following drugs: buproprion, chloroquine, chlorpromazine,  ephedrine, mephentermine, fenfluramine, phentermine,  phenylpropanolamine, pseudoephedrine, and propranolol.   Barbiturate Screen, Urine  Presumptive Negative Presumptive Negative   Comment: CUTOFF LEVEL: 200 NG/ML   Benzodiazepines Screen, Urine  Presumptive Negative Presumptive Negative   Comment: CUTOFF LEVEL: 200 NG/ML   Cannabinoid Screen, Urine  Presumptive Negative Presumptive Negative   Comment: CUTOFF " LEVEL: 50 NG/ML   Cocaine Metabolite Screen, Urine  Presumptive Negative Presumptive Negative   Comment: CUTOFF LEVEL: 150 NG/ML   Fentanyl Screen, Urine  Presumptive Negative Presumptive Negative   Comment: CUTOFF LEVEL: 5 NG/ML   Opiate Screen, Urine  Presumptive Negative Presumptive Negative   Comment: CUTOFF LEVEL: 300 NG/ML  The opiate screen does not detect fentanyl, meperidine, or  tramadol. Oxycodone is not consistently detected (refer to  Oxycodone Screen, Urine result).   Oxycodone Screen, Urine  Presumptive Negative Presumptive Negative   Comment: CUTOFF LEVEL: 100 NG/ML  This test will accurately detect both oxycodone and oxymorphone.   PCP Screen, Urine  Presumptive Negative Presumptive Negative   Comment: CUTOFF LEVEL:  25 NG/ML  Cross-reactivity has been reported with dextromethorphan.   Methadone Screen, Urine  Presumptive Negative Presumptive Negative   Comment: CUTOFF LEVEL: 150 NG/ML  The metabolite L-alpha-acetylmethadol (LAAM) is not  detected by this method in concentrations that would  be found in the urine of patients on LAAM therapy.   Resulting Agency [facility]                Narrative  Controlled Substance Agreement:  Date of the Last Agreement: 10/30/2024  Reviewed Controlled Substance Agreement including but not limited to the benefits, risks, and alternatives to treatment with a Controlled Substance medication(s).     Anorexiants:   What is the patient's goal of therapy? Weight loss      Is this being achieved with current treatment? yes     I have assessed the patient's continuing efforts to lose weight., I have assessed the patient's dedication to the treatment program and the response to treatment., and I have assessed the presence or absence of contraindications, adverse effects, and indicators of possible substance abuse that would necessitate cessation of treatment utilizing controlled substance.     Patient has demonstrated continued efforts to lose weight, is dedicated to the  treatment program and the response to treatment. and I have assessed for the presence or absence of contraindications, adverse effects, and indicators of possible substance abuse that would necessitate cessation of treatment utilizing controlled substance.     Activities of Daily Living:   Is your overall impression that this patient is benefiting (symptom reduction outweighs side effects) from anorexiants therapy? Yes      1. Physical Functioning: Better  2. Family Relationship: Better  3. Social Relationship: Better  4. Mood: Better  5. Sleep Patterns: Better  6. Overall Function: Better        Assessment/Plan   Problem List Items Addressed This Visit       GERD (gastroesophageal reflux disease)    Medication management - Primary     Other Visit Diagnoses       Obesity (BMI 30-39.9)        Relevant Medications    phentermine (Adipex-P) 37.5 mg tablet        Obesity, BMI 28  - she is doing calorie restriction and working out but weight had platuaed after losing 20 lbs on her own   - down 10 lbs after month #2 of adipex   - wellbutrin gave severe headaches   - discussed risk and benefits of adipex, discussed side effects   - signed csa and obtained uds and as expected   - sent adipex #4  - I have personally reviewed this patient's OARRS report and found it to be appropriate. This has vipul uploaded into the medical record. I have considered the risks of abuse, addiction, dependency and diversion and feel that it is clinically appropriate for this patient to be prescribed this controlled substance medication.         Advised pt that I will be leaving  in March of 2025, will follow up in 1 mo     Final diagnoses:   [E66.9] Obesity (BMI 30-39.9)   [Z79.899] Medication management   [K21.9] Gastroesophageal reflux disease without esophagitis